# Patient Record
Sex: FEMALE | Race: WHITE | NOT HISPANIC OR LATINO | Employment: OTHER | URBAN - METROPOLITAN AREA
[De-identification: names, ages, dates, MRNs, and addresses within clinical notes are randomized per-mention and may not be internally consistent; named-entity substitution may affect disease eponyms.]

---

## 2021-02-13 DIAGNOSIS — Z23 ENCOUNTER FOR IMMUNIZATION: ICD-10-CM

## 2021-02-24 ENCOUNTER — IMMUNIZATIONS (OUTPATIENT)
Dept: FAMILY MEDICINE CLINIC | Facility: HOSPITAL | Age: 66
End: 2021-02-24

## 2021-02-24 DIAGNOSIS — Z23 ENCOUNTER FOR IMMUNIZATION: Primary | ICD-10-CM

## 2021-02-24 PROCEDURE — 91300 SARS-COV-2 / COVID-19 MRNA VACCINE (PFIZER-BIONTECH) 30 MCG: CPT

## 2021-02-24 PROCEDURE — 0001A SARS-COV-2 / COVID-19 MRNA VACCINE (PFIZER-BIONTECH) 30 MCG: CPT

## 2021-03-15 RX ORDER — IRBESARTAN 150 MG/1
150 TABLET ORAL DAILY
COMMUNITY
Start: 2020-09-11 | End: 2021-05-12 | Stop reason: SDUPTHER

## 2021-03-15 RX ORDER — AMLODIPINE BESYLATE 5 MG/1
5 TABLET ORAL DAILY
COMMUNITY
Start: 2020-09-11 | End: 2021-10-19 | Stop reason: SDUPTHER

## 2021-03-15 RX ORDER — BUSPIRONE HYDROCHLORIDE 5 MG/1
5 TABLET ORAL 3 TIMES DAILY
COMMUNITY
Start: 2020-09-11 | End: 2021-10-19 | Stop reason: SDUPTHER

## 2021-03-15 RX ORDER — PANTOPRAZOLE SODIUM 40 MG/1
40 TABLET, DELAYED RELEASE ORAL DAILY
COMMUNITY
Start: 2020-09-11 | End: 2021-10-19 | Stop reason: SDUPTHER

## 2021-03-16 ENCOUNTER — IMMUNIZATIONS (OUTPATIENT)
Dept: FAMILY MEDICINE CLINIC | Facility: HOSPITAL | Age: 66
End: 2021-03-16

## 2021-03-16 DIAGNOSIS — Z23 ENCOUNTER FOR IMMUNIZATION: Primary | ICD-10-CM

## 2021-03-16 PROCEDURE — 0002A SARS-COV-2 / COVID-19 MRNA VACCINE (PFIZER-BIONTECH) 30 MCG: CPT

## 2021-03-16 PROCEDURE — 91300 SARS-COV-2 / COVID-19 MRNA VACCINE (PFIZER-BIONTECH) 30 MCG: CPT

## 2021-04-23 ENCOUNTER — OFFICE VISIT (OUTPATIENT)
Dept: FAMILY MEDICINE CLINIC | Facility: CLINIC | Age: 66
End: 2021-04-23
Payer: MEDICARE

## 2021-04-23 VITALS
HEIGHT: 64 IN | OXYGEN SATURATION: 99 % | SYSTOLIC BLOOD PRESSURE: 160 MMHG | HEART RATE: 87 BPM | DIASTOLIC BLOOD PRESSURE: 100 MMHG | WEIGHT: 140 LBS | TEMPERATURE: 98.2 F | BODY MASS INDEX: 23.9 KG/M2

## 2021-04-23 DIAGNOSIS — Z12.11 SCREENING FOR COLORECTAL CANCER: ICD-10-CM

## 2021-04-23 DIAGNOSIS — I10 ESSENTIAL HYPERTENSION: ICD-10-CM

## 2021-04-23 DIAGNOSIS — R01.1 MURMUR: ICD-10-CM

## 2021-04-23 DIAGNOSIS — Z13.1 SCREENING FOR DIABETES MELLITUS: ICD-10-CM

## 2021-04-23 DIAGNOSIS — T78.40XA ALLERGY, INITIAL ENCOUNTER: ICD-10-CM

## 2021-04-23 DIAGNOSIS — Z12.12 SCREENING FOR COLORECTAL CANCER: ICD-10-CM

## 2021-04-23 DIAGNOSIS — R42 LIGHTHEADEDNESS: Primary | ICD-10-CM

## 2021-04-23 DIAGNOSIS — Z11.59 NEED FOR HEPATITIS C SCREENING TEST: ICD-10-CM

## 2021-04-23 DIAGNOSIS — K21.9 GASTROESOPHAGEAL REFLUX DISEASE WITHOUT ESOPHAGITIS: ICD-10-CM

## 2021-04-23 DIAGNOSIS — Z12.31 ENCOUNTER FOR SCREENING MAMMOGRAM FOR MALIGNANT NEOPLASM OF BREAST: ICD-10-CM

## 2021-04-23 DIAGNOSIS — R45.89 DEPRESSED MOOD: ICD-10-CM

## 2021-04-23 DIAGNOSIS — Z23 ENCOUNTER FOR IMMUNIZATION: ICD-10-CM

## 2021-04-23 DIAGNOSIS — F41.9 ANXIETY: ICD-10-CM

## 2021-04-23 PROCEDURE — 99203 OFFICE O/P NEW LOW 30 MIN: CPT | Performed by: FAMILY MEDICINE

## 2021-04-23 RX ORDER — FLUTICASONE PROPIONATE 50 MCG
2 SPRAY, SUSPENSION (ML) NASAL DAILY
Qty: 1 BOTTLE | Refills: 3 | Status: SHIPPED | OUTPATIENT
Start: 2021-04-23 | End: 2021-10-19 | Stop reason: SDUPTHER

## 2021-04-23 NOTE — ASSESSMENT & PLAN NOTE
Frequent lightheadedness with sinus pressure - likely due to sinus congestion from allergies  Also consider orthostasis vs dehydration   R/o cardiac issues contributing, as holosystolic murmur appreciated on exam  · Will start flonase  · Encourage PO intake  · Obtain ECHO

## 2021-04-23 NOTE — ASSESSMENT & PLAN NOTE
Poorly controlled; has concurrent depression but no SI/HI  Not taking anything at the moment   Has used buspar and prozac in the past  R/o other organic causes of anxiety/depressed mood  · Discuss at future visit   · Screen at next visit with PIPPA 7 and PHQ-9  · Would recommend starting with lexapro or zoloft as first line   · Labs: CBC, CMP, TSH

## 2021-04-23 NOTE — PATIENT INSTRUCTIONS
· Debrox for earwax removal  · Check your blood pressure anytime throughout the day  Vary the time each day  Write down your blood pressure and bring it in at our next visit  Bring your cuff into the office at the next visit as well  · Please make sure you get your labs first thing in the morning on an empty stomach   This helps test for diabetes and it is important that you are fasting for at least 8 hours for this test   · I have ordered the following labs and imaging for you: CBC, CMP, TSH, Hepatitis C, ECHO, mammogram  · I have also referred you to GI to get a colonoscopy and endoscopy

## 2021-04-23 NOTE — PROGRESS NOTES
Assessment & Plan       Anxiety  Poorly controlled; has concurrent depression but no SI/HI  Not taking anything at the moment  Has used buspar and prozac in the past  R/o other organic causes of anxiety/depressed mood  · Discuss at future visit   · Screen at next visit with PIPPA 7 and PHQ-9  · Would recommend starting with lexapro or zoloft as first line   · Labs: CBC, CMP, TSH    Essential hypertension  Elevated in the office today, appears to be controlled at home; no concerning symptoms  Taking amlodipine 6mg and irbesartan 150mg daily, compliant  · Recommend bringing recording of BP (random times, taken daily) and bring in BP cuff - discussed with patient  · If BP not well controlled- would increase amlodipine to 10mg daily    Gastroesophageal reflux disease without esophagitis  Poorly controlled; still having some GERD symptoms and nausea on protonix 40 mg daily  Likely because taking incorrectly and also eating foods that exacerbate symptoms - taking after drinking a cup of coffee in the morning  I'm concerned for upper GI bleed since she coughed up some bright red blood the other day; reassuring as only a small amount and no recurrences since    Most likely due to upper GI bleed, however consider upper vs lower respiratory bleed vs upper GI bleed (nelson's vs AVM vs esophageal cancer) she also has significant risk factors including years of uncontrolled GERD and tobacco use in the past   · Provided with GERD education, and recommended she take protonix 30 minutes before eating; biggest barrier is she forgets- recommend she place the medication either in the bathroom or by her bed so she can take it first thing when she wakes up  She will consider this  · Referral to GI - likely will need EGD and screening colonoscopy  · CBC to monitor for anemia in context of bleed    Lightheadedness  Frequent lightheadedness with sinus pressure - likely due to sinus congestion from allergies   Also consider orthostasis vs dehydration  R/o cardiac issues contributing, as holosystolic murmur appreciated on exam  · Will start flonase  · Encourage PO intake  · Obtain ECHO       Diagnoses and all orders for this visit:    Lightheadedness    Need for hepatitis C screening test  -     Hepatitis C Antibody ([facility]); Future    Encounter for immunization    Screening for colorectal cancer  -     Ambulatory referral to Gastroenterology; Future    Screening for diabetes mellitus  -     Comprehensive metabolic panel; Future    Anxiety    Depressed mood  -     CBC and Platelet; Future  -     Comprehensive metabolic panel; Future  -     TSH, 3rd generation with Free T4 reflex; Future    Allergy, initial encounter    Encounter for screening mammogram for malignant neoplasm of breast  -     fluticasone (FLONASE) 50 mcg/act nasal spray; 2 sprays into each nostril daily  -     Mammo screening bilateral w 3d & cad; Future    Murmur  -     Echo complete with contrast if indicated; Future    Essential hypertension    Gastroesophageal reflux disease without esophagitis    Other orders  -     Cancel: TDAP VACCINE GREATER THAN OR EQUAL TO 8YO IM          Will also order: mammogram, Hep C antibody  Discussed with patient  History of Present Illness     Chief Complaint   Patient presents with   [address] [place] [address] Street is a 78 y o  female who presents today to establish care and for dizziness  Was seeing Dr Cindi Genao, however this is much closer for her  She said she coughed up blood a few weeks ago, bright red  Just a few spots  Lightheadedness  Happens when she moves forward, backwards, side to side - she gets too lightheaded  Has a lot of sinus congestion - worse at night when she is laying flat  Feels a lot of pressure in her face  Was taking claritin D which was helping with her congestion  However felt anxious with the claritin D  She eats  Does not drink a lot of fluid     Denies vision changes, hearing changes, ear pain or congestion  She did stab her ear with a Qtip, which caused some pain and blood  No hearing changes  This happened 2-3 weeks ago  Does get headaches occasionally - thinks they are more sinus and allergy related  PMHx:  Diagnosis - when - medications  · HTN - takes irbesartan 150mg daily and amlodipine 5mg daily ; blood pressures are 120s-130s/70s-80s at home  Sometimes higher at home but usually only when she is anxious  Highest is 150s-160s/90s-100s  Good compliance, doesn't miss any doses  · Anxiety - was taking buspirone 5mg daily but hasn't been taking it because wasn't sure if it was contributing to dizziness  Says it's prescribed TID, but she doesn't take it like this because it makes her tired  Hasn't tried anythign else  · GERD - She has had this for many years - for the last 20 years  Takes protonix 40mg daily  She is taking this after first meal and even after coffee      Allergies:  Substance - reaction  · PCN - gets hives  · Azithromycin - gets hives  · Denies every having anaphylactic reaction    FHx:  Family member - alive/dead - medical condition  · Mother - dead - she had a stroke   · Father - dead - brain tumor  · 5 sisters - 4  - 2  from lung cancer (were smokers), one had a cerebral aneurysm, one  from a complication of SLE    SocHx:  Tobacco: former smoker - quit 20 years ago  Smoked 1ppd for 20 years   Alcohol: denies current drinking  Drank socially when she was younger  Substance use: Denies any illicit drug use  Home/support system: lives home alone; gets around with a walker  Pets: no pets  Job: retired     Review of Systems   Review of Systems   Constitutional: Negative for chills, fever and unexpected weight change  HENT: Negative for rhinorrhea and sore throat  Negative for ear pain or discharge  Positive for congestion and sinus pressure and congestion  Eyes: Negative for pain and visual disturbance  Respiratory: Negative for cough and shortness of breath  Coughed up blood a few weeks ago  Cardiovascular: Negative for chest pain and leg swelling  Gastrointestinal: Negative for abdominal pain, constipation, diarrhea, vomiting  Positive for nausea  Endocrine: Negative for polydipsia and polyuria  Genitourinary: Negative for dysuria and hematuria  Musculoskeletal: Negative for joint swelling  Positive for joint pain  Skin: Negative for rash and wound  Allergic/Immunologic: Negative for environmental allergies and food allergies  Neurological: Negative for headaches  Psychiatric/Behavioral: Positive for dysphoric mood, anxiety and nervousness  No SI/HI  The following portions of the patient's history were reviewed and updated as appropriate: allergies, current medications, past family history, past medical history, past social history, past surgical history and problem list       Objective     /100   Pulse 87   Temp 98 2 °F (36 8 °C)   Ht 5' 3 5" (1 613 m)   Wt 63 5 kg (140 lb)   SpO2 99%   BMI 24 41 kg/m²        Physical Exam  Vitals signs and nursing note reviewed  Constitutional:       General: She is not in acute distress  Appearance: Normal appearance  She is well-developed  She is not ill-appearing, toxic-appearing or diaphoretic  Comments: Using walker   HENT:      Head: Normocephalic and atraumatic  Right Ear: External ear normal  There is no impacted cerumen  Left Ear: External ear normal  There is no impacted cerumen  Nose: Nose normal  No congestion or rhinorrhea  Mouth/Throat:      Mouth: Mucous membranes are moist       Pharynx: Oropharynx is clear  No oropharyngeal exudate or posterior oropharyngeal erythema  Eyes:      General: No scleral icterus  Right eye: No discharge  Left eye: No discharge  Conjunctiva/sclera: Conjunctivae normal    Neck:      Musculoskeletal: Normal range of motion and neck supple  Trachea: No tracheal deviation     Cardiovascular:      Rate and Rhythm: Normal rate and regular rhythm  Heart sounds: Murmur (holosystolic; best appreciated in the R upper sternal border) present  No friction rub  No gallop  Pulmonary:      Effort: Pulmonary effort is normal  No respiratory distress  Breath sounds: Normal breath sounds  No stridor  No wheezing or rales  Abdominal:      General: Bowel sounds are normal  There is no distension  Palpations: Abdomen is soft  Tenderness: There is no abdominal tenderness  There is no guarding or rebound  Musculoskeletal:      Right lower leg: No edema  Left lower leg: No edema  Skin:     General: Skin is warm  Capillary Refill: Capillary refill takes less than 2 seconds  Findings: No rash  Neurological:      Mental Status: She is alert     Psychiatric:         Mood and Affect: Mood normal          Behavior: Behavior normal            Signature: Chad Judd DO, [address], PGY-2  04/23/21

## 2021-04-23 NOTE — ASSESSMENT & PLAN NOTE
Poorly controlled; still having some GERD symptoms and nausea on protonix 40 mg daily  Likely because taking incorrectly and also eating foods that exacerbate symptoms - taking after drinking a cup of coffee in the morning  I'm concerned for upper GI bleed since she coughed up some bright red blood the other day; reassuring as only a small amount and no recurrences since    Most likely due to upper GI bleed, however consider upper vs lower respiratory bleed vs upper GI bleed (nelson's vs AVM vs esophageal cancer) she also has significant risk factors including years of uncontrolled GERD and tobacco use in the past   · Provided with GERD education, and recommended she take protonix 30 minutes before eating; biggest barrier is she forgets- recommend she place the medication either in the bathroom or by her bed so she can take it first thing when she wakes up   She will consider this  · Referral to GI - likely will need EGD and screening colonoscopy  · CBC to monitor for anemia in context of bleed

## 2021-04-23 NOTE — ASSESSMENT & PLAN NOTE
Elevated in the office today, appears to be controlled at home; no concerning symptoms   Taking amlodipine 6mg and irbesartan 150mg daily, compliant  · Recommend bringing recording of BP (random times, taken daily) and bring in BP cuff - discussed with patient  · If BP not well controlled- would increase amlodipine to 10mg daily

## 2021-05-10 ENCOUNTER — TELEPHONE (OUTPATIENT)
Dept: FAMILY MEDICINE CLINIC | Facility: CLINIC | Age: 66
End: 2021-05-10

## 2021-05-12 DIAGNOSIS — I10 ESSENTIAL HYPERTENSION: Primary | ICD-10-CM

## 2021-05-12 RX ORDER — IRBESARTAN 150 MG/1
150 TABLET ORAL DAILY
Qty: 30 TABLET | Refills: 0 | Status: SHIPPED | OUTPATIENT
Start: 2021-05-12 | End: 2021-06-09 | Stop reason: SDUPTHER

## 2021-05-24 ENCOUNTER — TELEPHONE (OUTPATIENT)
Dept: FAMILY MEDICINE CLINIC | Facility: CLINIC | Age: 66
End: 2021-05-24

## 2021-05-26 NOTE — TELEPHONE ENCOUNTER
Attempted to call patient on mobile - no option to leave a voicemail  Tried calling patient's home phone - sent to voicemail  LMOM to callback our office  Would prefer to start with tylenol until evaluation in the office  Please remind patient to get labs before next appointment  Thank you

## 2021-06-01 ENCOUNTER — OFFICE VISIT (OUTPATIENT)
Dept: FAMILY MEDICINE CLINIC | Facility: CLINIC | Age: 66
End: 2021-06-01
Payer: COMMERCIAL

## 2021-06-01 VITALS
HEART RATE: 71 BPM | WEIGHT: 137 LBS | TEMPERATURE: 98 F | HEIGHT: 64 IN | BODY MASS INDEX: 23.39 KG/M2 | DIASTOLIC BLOOD PRESSURE: 88 MMHG | SYSTOLIC BLOOD PRESSURE: 132 MMHG | OXYGEN SATURATION: 100 %

## 2021-06-01 DIAGNOSIS — M54.50 ACUTE BILATERAL LOW BACK PAIN WITHOUT SCIATICA: Primary | ICD-10-CM

## 2021-06-01 DIAGNOSIS — G89.29 CHRONIC PAIN OF LEFT KNEE: ICD-10-CM

## 2021-06-01 DIAGNOSIS — M25.562 CHRONIC PAIN OF LEFT KNEE: ICD-10-CM

## 2021-06-01 PROCEDURE — 3008F BODY MASS INDEX DOCD: CPT | Performed by: FAMILY MEDICINE

## 2021-06-01 PROCEDURE — 1160F RVW MEDS BY RX/DR IN RCRD: CPT | Performed by: FAMILY MEDICINE

## 2021-06-01 PROCEDURE — 1170F FXNL STATUS ASSESSED: CPT | Performed by: FAMILY MEDICINE

## 2021-06-01 PROCEDURE — G0439 PPPS, SUBSEQ VISIT: HCPCS | Performed by: FAMILY MEDICINE

## 2021-06-01 PROCEDURE — 99213 OFFICE O/P EST LOW 20 MIN: CPT | Performed by: FAMILY MEDICINE

## 2021-06-01 PROCEDURE — 1125F AMNT PAIN NOTED PAIN PRSNT: CPT | Performed by: FAMILY MEDICINE

## 2021-06-01 PROCEDURE — 3725F SCREEN DEPRESSION PERFORMED: CPT | Performed by: FAMILY MEDICINE

## 2021-06-01 PROCEDURE — 3288F FALL RISK ASSESSMENT DOCD: CPT | Performed by: FAMILY MEDICINE

## 2021-06-01 PROCEDURE — 1036F TOBACCO NON-USER: CPT | Performed by: FAMILY MEDICINE

## 2021-06-01 RX ORDER — NAPROXEN 500 MG/1
500 TABLET ORAL 2 TIMES DAILY WITH MEALS
Qty: 28 TABLET | Refills: 0 | Status: SHIPPED | OUTPATIENT
Start: 2021-06-01 | End: 2022-05-09 | Stop reason: ALTCHOICE

## 2021-06-01 NOTE — PROGRESS NOTES
Assessment and Plan:     Problem List Items Addressed This Visit     None      Visit Diagnoses     Acute bilateral low back pain without sciatica    -  Primary    Relevant Medications    naproxen (NAPROSYN) 500 mg tablet    Chronic pain of left knee        Relevant Orders    XR knee 3 vw left non injury           Preventive health issues were discussed with patient, and age appropriate screening tests were ordered as noted in patient's After Visit Summary  Personalized health advice and appropriate referrals for health education or preventive services given if needed, as noted in patient's After Visit Summary  History of Present Illness:     Patient presents for Welcome to Medicare visit  Patient Care Team:  Kyra Worrell DO as PCP - General (Family Medicine)     Review of Systems:     Review of Systems   Constitutional: Negative for chills and fever  HENT: Negative for congestion and sore throat  Respiratory: Negative for cough and shortness of breath  Cardiovascular: Negative for chest pain and leg swelling  Gastrointestinal: Positive for constipation  Negative for abdominal pain, anal bleeding, blood in stool, diarrhea, nausea and vomiting  Endocrine: Negative for polydipsia and polyuria  Genitourinary: Negative for difficulty urinating and dysuria  Musculoskeletal: Positive for back pain and gait problem (difficulty walking due to knee pain)  Neurological: Negative for light-headedness and headaches  Psychiatric/Behavioral: Positive for sleep disturbance  Negative for suicidal ideas  The patient is nervous/anxious         Problem List:     Patient Active Problem List   Diagnosis    Essential hypertension    Anxiety    Gastroesophageal reflux disease without esophagitis    Lightheadedness      Past Medical and Surgical History:     Past Medical History:   Diagnosis Date    Anxiety     Arthritis     left knee    Depression     Hypertension     Migraines     Rhinitis     Rosacea      Past Surgical History:   Procedure Laterality Date    BREAST LUMPECTOMY Left     pathology benign    CYST REMOVAL  2019    Excision of left axillary sebaceous cyst 2cm      Family History:     Family History   Problem Relation Age of Onset    Stroke Mother     Thrombocytopenia Mother     Other Father         neoplasm of brain     Brain cancer Father     Hypertension Father     Lung cancer Sister     Lung cancer Sister     Lupus Sister     Cerebral aneurysm Sister       Social History:        Social History     Socioeconomic History    Marital status:      Spouse name: None    Number of children: None    Years of education: None    Highest education level: None   Occupational History    Occupation: retired   Social Needs    Financial resource strain: None    Food insecurity     Worry: None     Inability: None    Transportation needs     Medical: None     Non-medical: None   Tobacco Use    Smoking status: Former Smoker     Packs/day:  00     Years: 20 00     Pack years: 20      Types: Cigarettes     Quit date:      Years since quittin 4    Smokeless tobacco: Never Used   Substance and Sexual Activity    Alcohol use: Not Currently    Drug use: Never    Sexual activity: None   Lifestyle    Physical activity     Days per week: None     Minutes per session: None    Stress: None   Relationships    Social connections     Talks on phone: None     Gets together: None     Attends Faith service: None     Active member of club or organization: None     Attends meetings of clubs or organizations: None     Relationship status: None    Intimate partner violence     Fear of current or ex partner: None     Emotionally abused: None     Physically abused: None     Forced sexual activity: None   Other Topics Concern    None   Social History Narrative    declined for colonoscopy/FOBT    Most recent tobacco use screenin2019      Advance directive:   Yes, living will     As per An Distance       Medications and Allergies:     Current Outpatient Medications   Medication Sig Dispense Refill    amLODIPine (NORVASC) 5 mg tablet Take 5 mg by mouth daily      busPIRone (BUSPAR) 5 mg tablet Take 5 mg by mouth Three times a day      fluticasone (FLONASE) 50 mcg/act nasal spray 2 sprays into each nostril daily 1 Bottle 3    irbesartan (AVAPRO) 150 mg tablet Take 1 tablet (150 mg total) by mouth daily 30 tablet 0    pantoprazole (PROTONIX) 40 mg tablet Take 40 mg by mouth daily      naproxen (NAPROSYN) 500 mg tablet Take 1 tablet (500 mg total) by mouth 2 (two) times a day with meals for 14 days 28 tablet 0     No current facility-administered medications for this visit  Allergies   Allergen Reactions    Azithromycin Hives and Itching    Clindamycin Hives    Penicillins Hives and Itching      Immunizations:     Immunization History   Administered Date(s) Administered    Influenza Split High Dose Preservative Free IM 09/01/2020    Pneumococcal Conjugate 13-Valent 09/15/2020    SARS-CoV-2 / COVID-19 mRNA IM ([employer]) 02/24/2021, 03/16/2021      Health Maintenance:         Topic Date Due    Hepatitis C Screening  Never done    Colorectal Cancer Screening  Never done    MAMMOGRAM  10/25/2020         Topic Date Due    DTaP,Tdap,and Td Vaccines (1 - Tdap) Never done      Medicare Screening Tests and Risk Assessments:     Friday juanita is here for her Subsequent Wellness visit  Last Medicare Wellness visit information reviewed, patient interviewed and updates made to the record today  Health Risk Assessment:   Patient rates overall health as good  Patient feels that their physical health rating is same  Patient is satisfied with their life  Eyesight was rated as slightly worse  Hearing was rated as same  Patient feels that their emotional and mental health rating is same  Patients states they are sometimes angry   Patient states they are always unusually tired/fatigued  Pain experienced in the last 7 days has been a lot  Patient's pain rating has been 8/10  Patient states that she has experienced no weight loss or gain in last 6 months  Wakes up in the middle of the night twice to use the restroom  Has difficulty falling back asleep  Has some anxiety that prevents ability to fall back asleep as well  Just worries about friend  Depression Screening:   PHQ-2 Score: 1      Fall Risk Screening: In the past year, patient has experienced: no history of falling in past year      Urinary Incontinence Screening:   Patient has not leaked urine accidently in the last six months  Home Safety:  Patient has trouble with stairs inside or outside of their home  Patient has working smoke alarms and has no working carbon monoxide detector  Home safety hazards include: none  Issue with stairs is from knee pain - injured it 2 years ago  Nutrition:   Current diet is Regular  She does have a sweet tooth - loves chocolate    Medications:   Patient is currently taking over-the-counter supplements  OTC medications include: see medication list  Patient is able to manage medications  Activities of Daily Living (ADLs)/Instrumental Activities of Daily Living (IADLs):   Walk and transfer into and out of bed and chair?: Yes  Dress and groom yourself?: Yes    Bathe or shower yourself?: Yes    Feed yourself? Yes  Do your laundry/housekeeping?: Yes  Manage your money, pay your bills and track your expenses?: Yes  Make your own meals?: Yes    Do your own shopping?: Yes    Previous Hospitalizations:   Any hospitalizations or ED visits within the last 12 months?: No      Advance Care Planning:   Living will: Yes    Durable POA for healthcare: No    Advanced directive: Yes    Five wishes given: Yes      Comments: Discussed POA  Discussed that if she does not get one notorized, it will automatically go to next of kin       Cognitive Screening:   Provider or family/friend/caregiver concerned regarding cognition?: No    PREVENTIVE SCREENINGS      Cardiovascular Screening:    General: Screening Not Indicated      Diabetes Screening:     General: Screening Not Indicated      Colorectal Cancer Screening:       Due for: FOBT/FIT      Breast Cancer Screening:     General: Screening Current      Cervical Cancer Screening:    General: Screening Not Indicated      Lung Cancer Screening:     General: Screening Not Indicated      Hepatitis C Screening:      Hep C Screening Accepted: Yes        Preventive Screening Comments: Blood work to be completed shortly    Screening, Brief Intervention, and Referral to Treatment (SBIRT)    Screening  Typical number of drinks in a day: 0    Single Item Drug Screening:  How often have you used an illegal drug (including marijuana) or a prescription medication for non-medical reasons in the past year? never    Single Item Drug Screen Score: 0  Interpretation: Negative screen for possible drug use disorder    Other Counseling Topics:   Car/seat belt/driving safety, sunscreen and regular weightbearing exercise  No exam data present     Physical Exam:     /88 (BP Location: Right arm, Patient Position: Sitting, Cuff Size: Large)   Pulse 71   Temp 98 °F (36 7 °C)   Ht 5' 3 5" (1 613 m)   Wt 62 1 kg (137 lb)   SpO2 100%   BMI 23 89 kg/m²     Physical Exam  Constitutional:       General: She is not in acute distress  Appearance: Normal appearance  She is normal weight  She is not ill-appearing, toxic-appearing or diaphoretic  HENT:      Head: Normocephalic and atraumatic  Mouth/Throat:      Mouth: Mucous membranes are moist       Pharynx: Oropharynx is clear  No oropharyngeal exudate  Eyes:      General: No scleral icterus  Right eye: No discharge  Left eye: No discharge  Conjunctiva/sclera: Conjunctivae normal    Neck:      Musculoskeletal: Normal range of motion     Pulmonary:      Effort: Pulmonary effort is normal  No respiratory distress  Breath sounds: Normal breath sounds  No stridor  No wheezing  Musculoskeletal:      Right lower leg: No edema  Left lower leg: No edema  Comments: L knee tenderness to palpation on the patella  No crepitus appreciated with knee flexion or extension  Pain with flexion and extension  No joint effusions appreciated or joint swelling  Slight tenderness to palpation along medial and lateral joint lines    Lumbar back tenderness to palpation  Slight hypertonicity to paraspinal muscles  No midline tenderness when palpating spinous processes of vertebral bodies   Neurological:      Mental Status: She is alert and oriented to person, place, and time  Mental status is at baseline     Psychiatric:         Mood and Affect: Mood normal          Behavior: Behavior normal           Nadia East,

## 2021-06-01 NOTE — PATIENT INSTRUCTIONS
Medicare Preventive Visit Patient Instructions  Thank you for completing your Welcome to Medicare Visit or Medicare Annual Wellness Visit today  Your next wellness visit will be due in one year (6/2/2022)  The screening/preventive services that you may require over the next 5-10 years are detailed below  Some tests may not apply to you based off risk factors and/or age  Screening tests ordered at today's visit but not completed yet may show as past due  Also, please note that scanned in results may not display below  Preventive Screenings:  Service Recommendations Previous Testing/Comments   Colorectal Cancer Screening  * Colonoscopy    * Fecal Occult Blood Test (FOBT)/Fecal Immunochemical Test (FIT)  * Fecal DNA/Cologuard Test  * Flexible Sigmoidoscopy Age: 54-65 years old   Colonoscopy: every 10 years (may be performed more frequently if at higher risk)  OR  FOBT/FIT: every 1 year  OR  Cologuard: every 3 years  OR  Sigmoidoscopy: every 5 years  Screening may be recommended earlier than age 48 if at higher risk for colorectal cancer  Also, an individualized decision between you and your healthcare provider will decide whether screening between the ages of 74-80 would be appropriate  Colonoscopy: Not on file  FOBT/FIT: Not on file  Cologuard: Not on file  Sigmoidoscopy: Not on file          Breast Cancer Screening Age: 36 years old  Frequency: every 1-2 years  Not required if history of left and right mastectomy Mammogram: 10/25/2019        Cervical Cancer Screening Between the ages of 21-29, pap smear recommended once every 3 years  Between the ages of 33-67, can perform pap smear with HPV co-testing every 5 years     Recommendations may differ for women with a history of total hysterectomy, cervical cancer, or abnormal pap smears in past  Pap Smear: Not on file        Hepatitis C Screening Once for adults born between [facility]  More frequently in patients at high risk for Hepatitis C Hep C Antibody: Not on file        Diabetes Screening 1-2 times per year if you're at risk for diabetes or have pre-diabetes Fasting glucose: No results in last 5 years   A1C: No results in last 5 years        Cholesterol Screening Once every 5 years if you don't have a lipid disorder  May order more often based on risk factors  Lipid panel: Not on file          Other Preventive Screenings Covered by Medicare:  1  Abdominal Aortic Aneurysm (AAA) Screening: covered once if your at risk  You're considered to be at risk if you have a family history of AAA  2  Lung Cancer Screening: covers low dose CT scan once per year if you meet all of the following conditions: (1) Age 50-69; (2) No signs or symptoms of lung cancer; (3) Current smoker or have quit smoking within the last 15 years; (4) You have a tobacco smoking history of at least 30 pack years (packs per day multiplied by number of years you smoked); (5) You get a written order from a healthcare provider  3  Glaucoma Screening: covered annually if you're considered high risk: (1) You have diabetes OR (2) Family history of glaucoma OR (3)  aged 48 and older OR (3)  American aged 72 and older  3  Osteoporosis Screening: covered every 2 years if you meet one of the following conditions: (1) You're estrogen deficient and at risk for osteoporosis based off medical history and other findings; (2) Have a vertebral abnormality; (3) On glucocorticoid therapy for more than 3 months; (4) Have primary hyperparathyroidism; (5) On osteoporosis medications and need to assess response to drug therapy  · Last bone density test (DXA Scan): 10/25/2019   5  HIV Screening: covered annually if you're between the age of 15-65  Also covered annually if you are younger than 13 and older than 72 with risk factors for HIV infection  For pregnant patients, it is covered up to 3 times per pregnancy      Immunizations:  Immunization Recommendations   Influenza Vaccine Annual influenza vaccination during flu season is recommended for all persons aged >= 6 months who do not have contraindications   Pneumococcal Vaccine (Prevnar and Pneumovax)  * Prevnar = PCV13  * Pneumovax = PPSV23   Adults 25-60 years old: 1-3 doses may be recommended based on certain risk factors  Adults 72 years old: Prevnar (PCV13) vaccine recommended followed by Pneumovax (PPSV23) vaccine  If already received PPSV23 since turning 65, then PCV13 recommended at least one year after PPSV23 dose  Hepatitis B Vaccine 3 dose series if at intermediate or high risk (ex: diabetes, end stage renal disease, liver disease)   Tetanus (Td) Vaccine - COST NOT COVERED BY MEDICARE PART B Following completion of primary series, a booster dose should be given every 10 years to maintain immunity against tetanus  Td may also be given as tetanus wound prophylaxis  Tdap Vaccine - COST NOT COVERED BY MEDICARE PART B Recommended at least once for all adults  For pregnant patients, recommended with each pregnancy  Shingles Vaccine (Shingrix) - COST NOT COVERED BY MEDICARE PART B  2 shot series recommended in those aged 48 and above     Health Maintenance Due:      Topic Date Due    Hepatitis C Screening  Never done    Colorectal Cancer Screening  Never done    MAMMOGRAM  10/25/2020     Immunizations Due:      Topic Date Due    DTaP,Tdap,and Td Vaccines (1 - Tdap) Never done     Advance Directives   What are advance directives? Advance directives are legal documents that state your wishes and plans for medical care  These plans are made ahead of time in case you lose your ability to make decisions for yourself  Advance directives can apply to any medical decision, such as the treatments you want, and if you want to donate organs  What are the types of advance directives? There are many types of advance directives, and each state has rules about how to use them  You may choose a combination of any of the following:  · Living will:   This is a written record of the treatment you want  You can also choose which treatments you do not want, which to limit, and which to stop at a certain time  This includes surgery, medicine, IV fluid, and tube feedings  · Durable power of  for healthcare [place] [facility]): This is a written record that states who you want to make healthcare choices for you when you are unable to make them for yourself  This person, called a proxy, is usually a family member or a friend  You may choose more than 1 proxy  · Do not resuscitate (DNR) order:  A DNR order is used in case your heart stops beating or you stop breathing  It is a request not to have certain forms of treatment, such as CPR  A DNR order may be included in other types of advance directives  · Medical directive: This covers the care that you want if you are in a coma, near death, or unable to make decisions for yourself  You can list the treatments you want for each condition  Treatment may include pain medicine, surgery, blood transfusions, dialysis, IV or tube feedings, and a ventilator (breathing machine)  · Values history: This document has questions about your views, beliefs, and how you feel and think about life  This information can help others choose the care that you would choose  Why are advance directives important? An advance directive helps you control your care  Although spoken wishes may be used, it is better to have your wishes written down  Spoken wishes can be misunderstood, or not followed  Treatments may be given even if you do not want them  An advance directive may make it easier for your family to make difficult choices about your care  © Copyright [employer] 2018 Information is for End User's use only and may not be sold, redistributed or otherwise used for commercial purposes   All illustrations and images included in CareNotes® are the copyrighted property of [employer]  or [facility] Preventive Visit Patient Instructions  Thank you for completing your Welcome to Medicare Visit or Medicare Annual Wellness Visit today  Your next wellness visit will be due in one year (6/2/2022)  The screening/preventive services that you may require over the next 5-10 years are detailed below  Some tests may not apply to you based off risk factors and/or age  Screening tests ordered at today's visit but not completed yet may show as past due  Also, please note that scanned in results may not display below  Preventive Screenings:  Service Recommendations Previous Testing/Comments   Colorectal Cancer Screening  * Colonoscopy    * Fecal Occult Blood Test (FOBT)/Fecal Immunochemical Test (FIT)  * Fecal DNA/Cologuard Test  * Flexible Sigmoidoscopy Age: 54-65 years old   Colonoscopy: every 10 years (may be performed more frequently if at higher risk)  OR  FOBT/FIT: every 1 year  OR  Cologuard: every 3 years  OR  Sigmoidoscopy: every 5 years  Screening may be recommended earlier than age 48 if at higher risk for colorectal cancer  Also, an individualized decision between you and your healthcare provider will decide whether screening between the ages of 74-80 would be appropriate  Colonoscopy: Not on file  FOBT/FIT: Not on file  Cologuard: Not on file  Sigmoidoscopy: Not on file          Breast Cancer Screening Age: 36 years old  Frequency: every 1-2 years  Not required if history of left and right mastectomy Mammogram: 10/25/2019    Screening Current   Cervical Cancer Screening Between the ages of 21-29, pap smear recommended once every 3 years  Between the ages of 33-67, can perform pap smear with HPV co-testing every 5 years     Recommendations may differ for women with a history of total hysterectomy, cervical cancer, or abnormal pap smears in past  Pap Smear: Not on file    Screening Not Indicated   Hepatitis C Screening Once for adults born between 1945 and 1965  More frequently in patients at high risk for Hepatitis C Hep C Antibody: Not on file        Diabetes Screening 1-2 times per year if you're at risk for diabetes or have pre-diabetes Fasting glucose: No results in last 5 years   A1C: No results in last 5 years        Cholesterol Screening Once every 5 years if you don't have a lipid disorder  May order more often based on risk factors  Lipid panel: Not on file          Other Preventive Screenings Covered by Medicare:  6  Abdominal Aortic Aneurysm (AAA) Screening: covered once if your at risk  You're considered to be at risk if you have a family history of AAA  7  Lung Cancer Screening: covers low dose CT scan once per year if you meet all of the following conditions: (1) Age 50-69; (2) No signs or symptoms of lung cancer; (3) Current smoker or have quit smoking within the last 15 years; (4) You have a tobacco smoking history of at least 30 pack years (packs per day multiplied by number of years you smoked); (5) You get a written order from a healthcare provider  8  Glaucoma Screening: covered annually if you're considered high risk: (1) You have diabetes OR (2) Family history of glaucoma OR (3)  aged 48 and older OR (3)  American aged 72 and older  5  Osteoporosis Screening: covered every 2 years if you meet one of the following conditions: (1) You're estrogen deficient and at risk for osteoporosis based off medical history and other findings; (2) Have a vertebral abnormality; (3) On glucocorticoid therapy for more than 3 months; (4) Have primary hyperparathyroidism; (5) On osteoporosis medications and need to assess response to drug therapy  · Last bone density test (DXA Scan): 10/25/2019   10  HIV Screening: covered annually if you're between the age of 15-65  Also covered annually if you are younger than 13 and older than 72 with risk factors for HIV infection  For pregnant patients, it is covered up to 3 times per pregnancy      Immunizations:  Immunization Recommendations   Influenza Vaccine Annual influenza vaccination during flu season is recommended for all persons aged >= 6 months who do not have contraindications   Pneumococcal Vaccine (Prevnar and Pneumovax)  * Prevnar = PCV13  * Pneumovax = PPSV23   Adults 25-60 years old: 1-3 doses may be recommended based on certain risk factors  Adults 72 years old: Prevnar (PCV13) vaccine recommended followed by Pneumovax (PPSV23) vaccine  If already received PPSV23 since turning 65, then PCV13 recommended at least one year after PPSV23 dose  Hepatitis B Vaccine 3 dose series if at intermediate or high risk (ex: diabetes, end stage renal disease, liver disease)   Tetanus (Td) Vaccine - COST NOT COVERED BY MEDICARE PART B Following completion of primary series, a booster dose should be given every 10 years to maintain immunity against tetanus  Td may also be given as tetanus wound prophylaxis  Tdap Vaccine - COST NOT COVERED BY MEDICARE PART B Recommended at least once for all adults  For pregnant patients, recommended with each pregnancy  Shingles Vaccine (Shingrix) - COST NOT COVERED BY MEDICARE PART B  2 shot series recommended in those aged 48 and above     Health Maintenance Due:      Topic Date Due    Hepatitis C Screening  Never done    Colorectal Cancer Screening  Never done    MAMMOGRAM  10/25/2020     Immunizations Due:      Topic Date Due    DTaP,Tdap,and Td Vaccines (1 - Tdap) Never done     Advance Directives   What are advance directives? Advance directives are legal documents that state your wishes and plans for medical care  These plans are made ahead of time in case you lose your ability to make decisions for yourself  Advance directives can apply to any medical decision, such as the treatments you want, and if you want to donate organs  What are the types of advance directives? There are many types of advance directives, and each state has rules about how to use them   You may choose a combination of any of the following:  · Living will: This is a written record of the treatment you want  You can also choose which treatments you do not want, which to limit, and which to stop at a certain time  This includes surgery, medicine, IV fluid, and tube feedings  · Durable power of  for healthcare [place] [facility]): This is a written record that states who you want to make healthcare choices for you when you are unable to make them for yourself  This person, called a proxy, is usually a family member or a friend  You may choose more than 1 proxy  · Do not resuscitate (DNR) order:  A DNR order is used in case your heart stops beating or you stop breathing  It is a request not to have certain forms of treatment, such as CPR  A DNR order may be included in other types of advance directives  · Medical directive: This covers the care that you want if you are in a coma, near death, or unable to make decisions for yourself  You can list the treatments you want for each condition  Treatment may include pain medicine, surgery, blood transfusions, dialysis, IV or tube feedings, and a ventilator (breathing machine)  · Values history: This document has questions about your views, beliefs, and how you feel and think about life  This information can help others choose the care that you would choose  Why are advance directives important? An advance directive helps you control your care  Although spoken wishes may be used, it is better to have your wishes written down  Spoken wishes can be misunderstood, or not followed  Treatments may be given even if you do not want them  An advance directive may make it easier for your family to make difficult choices about your care  © Copyright [employer] 2018 Information is for End User's use only and may not be sold, redistributed or otherwise used for commercial purposes   All illustrations and images included in CareNotes® are the copyrighted property of A D A M , Inc  or [employer]

## 2021-06-01 NOTE — PROGRESS NOTES
Assessment & Plan        Diagnoses and all orders for this visit:    Acute bilateral low back pain without sciatica  -     naproxen (NAPROSYN) 500 mg tablet; Take 1 tablet (500 mg total) by mouth 2 (two) times a day with meals for 14 days    Chronic pain of left knee  -     XR knee 3 vw left non injury; Future           Chronic Knee pain - likely due to initial injury 2 years ago  May have developed OA  May benefit from steroid injection  Will obtain XR to monitor for any osseous abnormality  Recommended PT since I think she may be altering gait to avoid knee pain, which may be leading to back pain  Not interested at this time  She says, "I am stubborn "    Acute low back pain - consider muscle strain/ hypertonicity in paraspinals or quadratus lumborum or illeopsoas  Would benefit from PT and anti-inflammatories  Will prescribe 2 week course of naproxen 500mg BID and encouraged compliance to naproxen and protonix, and discussed importance of taking with food  Follow up recommended in 6 weeks  Patient reports she likely won't come over the summer  History of Present Illness     Chief Complaint   Patient presents with    Medicare Wellness Visit       Binta Harrison is a 78 y o  female who presents today for back pain  Been going in for 1 week  Started in lower back  Radiates in the low back, but not down the back of the legs  Constantly there even when she is sitting  Is not disrupting sleep  Denies any trigger  Denies any any loss of sensation or weakness in the lower extremities  Has been taking aleve and tylenol with minimal relief of back pain  She says she doesn't do well with patches  Does not like the patches  Also has some L knee pain  She fell on it 2 years ago  Knee pain worsens with activity    She is having someone come on June 8 to get labs drawn  Review of Systems     Review of Systems   Constitutional: Negative for chills and fever     Gastrointestinal: Positive for constipation  Negative for abdominal pain, anal bleeding, blood in stool, diarrhea, nausea and vomiting  Musculoskeletal: Positive for arthralgias (just L knee), back pain and myalgias  Negative for joint swelling and neck pain  Skin: Negative for rash and wound  Neurological: Negative for weakness and numbness  Psychiatric/Behavioral: Positive for sleep disturbance  The patient is nervous/anxious  The following portions of the patient's history were reviewed and updated as appropriate: allergies, current medications, past family history, past medical history, past social history, past surgical history and problem list     Objective     /88 (BP Location: Right arm, Patient Position: Sitting, Cuff Size: Large)   Pulse 71   Temp 98 °F (36 7 °C)   Ht 5' 3 5" (1 613 m)   Wt 62 1 kg (137 lb)   SpO2 100%   BMI 23 89 kg/m²          Physical Exam  Constitutional:       General: She is not in acute distress  Appearance: Normal appearance  She is not ill-appearing, toxic-appearing or diaphoretic  HENT:      Head: Normocephalic and atraumatic  Nose: Nose normal    Eyes:      General: No scleral icterus  Right eye: No discharge  Left eye: No discharge  Conjunctiva/sclera: Conjunctivae normal    Pulmonary:      Effort: Pulmonary effort is normal  No respiratory distress  Breath sounds: No stridor  No wheezing  Musculoskeletal:      Right lower leg: No edema  Left lower leg: No edema  Comments: L knee tenderness to palpation on the patella  No crepitus appreciated with knee flexion or extension  Pain with flexion and extension  No joint effusions appreciated or joint swelling  Slight tenderness to palpation along medial and lateral joint lines    Lumbar back tenderness to palpation  Slight hypertonicity to paraspinal muscles   No midline tenderness when palpating spinous processes of vertebral bodies   Skin:     Capillary Refill: Capillary refill takes less than 2 seconds  Neurological:      Mental Status: She is alert and oriented to person, place, and time     Psychiatric:         Mood and Affect: Mood normal          Behavior: Behavior normal            Signature: Hitesh Caballero DO, [address], PGY-2  06/01/21

## 2021-06-08 ENCOUNTER — APPOINTMENT (OUTPATIENT)
Dept: LAB | Facility: HOSPITAL | Age: 66
End: 2021-06-08
Payer: COMMERCIAL

## 2021-06-08 DIAGNOSIS — Z11.59 NEED FOR HEPATITIS C SCREENING TEST: ICD-10-CM

## 2021-06-08 DIAGNOSIS — Z13.1 SCREENING FOR DIABETES MELLITUS: ICD-10-CM

## 2021-06-08 DIAGNOSIS — R45.89 DEPRESSED MOOD: ICD-10-CM

## 2021-06-08 LAB
ALBUMIN SERPL BCP-MCNC: 4.3 G/DL (ref 3.5–5)
ALP SERPL-CCNC: 72 U/L (ref 46–116)
ALT SERPL W P-5'-P-CCNC: 23 U/L (ref 12–78)
ANION GAP SERPL CALCULATED.3IONS-SCNC: 9 MMOL/L (ref 4–13)
AST SERPL W P-5'-P-CCNC: 19 U/L (ref 5–45)
BILIRUB SERPL-MCNC: 0.45 MG/DL (ref 0.2–1)
BUN SERPL-MCNC: 11 MG/DL (ref 5–25)
CALCIUM SERPL-MCNC: 9.9 MG/DL (ref 8.3–10.1)
CHLORIDE SERPL-SCNC: 99 MMOL/L (ref 100–108)
CO2 SERPL-SCNC: 25 MMOL/L (ref 21–32)
CREAT SERPL-MCNC: 0.53 MG/DL (ref 0.6–1.3)
ERYTHROCYTE [DISTWIDTH] IN BLOOD BY AUTOMATED COUNT: 13.5 % (ref 11.6–15.1)
GFR SERPL CREATININE-BSD FRML MDRD: 99 ML/MIN/1.73SQ M
GLUCOSE SERPL-MCNC: 98 MG/DL (ref 65–140)
HCT VFR BLD AUTO: 40.1 % (ref 34.8–46.1)
HCV AB SER QL: NORMAL
HGB BLD-MCNC: 13.1 G/DL (ref 11.5–15.4)
MCH RBC QN AUTO: 31.3 PG (ref 26.8–34.3)
MCHC RBC AUTO-ENTMCNC: 32.7 G/DL (ref 31.4–37.4)
MCV RBC AUTO: 96 FL (ref 82–98)
PLATELET # BLD AUTO: 339 THOUSANDS/UL (ref 149–390)
PMV BLD AUTO: 9.6 FL (ref 8.9–12.7)
POTASSIUM SERPL-SCNC: 3.5 MMOL/L (ref 3.5–5.3)
PROT SERPL-MCNC: 8.1 G/DL (ref 6.4–8.2)
RBC # BLD AUTO: 4.18 MILLION/UL (ref 3.81–5.12)
SODIUM SERPL-SCNC: 133 MMOL/L (ref 136–145)
TSH SERPL DL<=0.05 MIU/L-ACNC: 1.94 UIU/ML (ref 0.36–3.74)
WBC # BLD AUTO: 7.29 THOUSAND/UL (ref 4.31–10.16)

## 2021-06-08 PROCEDURE — 86803 HEPATITIS C AB TEST: CPT

## 2021-06-08 PROCEDURE — 84443 ASSAY THYROID STIM HORMONE: CPT

## 2021-06-08 PROCEDURE — 85027 COMPLETE CBC AUTOMATED: CPT

## 2021-06-08 PROCEDURE — 80053 COMPREHEN METABOLIC PANEL: CPT

## 2021-06-08 PROCEDURE — 36415 COLL VENOUS BLD VENIPUNCTURE: CPT

## 2021-06-09 DIAGNOSIS — I10 ESSENTIAL HYPERTENSION: ICD-10-CM

## 2021-06-11 RX ORDER — IRBESARTAN 150 MG/1
150 TABLET ORAL DAILY
Qty: 30 TABLET | Refills: 0 | Status: SHIPPED | OUTPATIENT
Start: 2021-06-11 | End: 2021-10-19 | Stop reason: SDUPTHER

## 2021-09-24 ENCOUNTER — TELEPHONE (OUTPATIENT)
Dept: FAMILY MEDICINE CLINIC | Facility: CLINIC | Age: 66
End: 2021-09-24

## 2021-10-19 ENCOUNTER — OFFICE VISIT (OUTPATIENT)
Dept: FAMILY MEDICINE CLINIC | Facility: CLINIC | Age: 66
End: 2021-10-19
Payer: COMMERCIAL

## 2021-10-19 VITALS
WEIGHT: 138.38 LBS | BODY MASS INDEX: 24.13 KG/M2 | HEART RATE: 87 BPM | SYSTOLIC BLOOD PRESSURE: 142 MMHG | TEMPERATURE: 98.5 F | DIASTOLIC BLOOD PRESSURE: 90 MMHG | OXYGEN SATURATION: 99 %

## 2021-10-19 DIAGNOSIS — Z23 ENCOUNTER FOR IMMUNIZATION: ICD-10-CM

## 2021-10-19 DIAGNOSIS — Z12.31 ENCOUNTER FOR SCREENING MAMMOGRAM FOR MALIGNANT NEOPLASM OF BREAST: ICD-10-CM

## 2021-10-19 DIAGNOSIS — F41.9 ANXIETY: ICD-10-CM

## 2021-10-19 DIAGNOSIS — K21.9 GASTROESOPHAGEAL REFLUX DISEASE WITHOUT ESOPHAGITIS: ICD-10-CM

## 2021-10-19 DIAGNOSIS — I10 ESSENTIAL HYPERTENSION: ICD-10-CM

## 2021-10-19 DIAGNOSIS — M25.562 LEFT KNEE PAIN, UNSPECIFIED CHRONICITY: Primary | ICD-10-CM

## 2021-10-19 PROBLEM — M25.561 RIGHT KNEE PAIN: Status: ACTIVE | Noted: 2021-10-19

## 2021-10-19 PROCEDURE — 90662 IIV NO PRSV INCREASED AG IM: CPT | Performed by: FAMILY MEDICINE

## 2021-10-19 PROCEDURE — 99213 OFFICE O/P EST LOW 20 MIN: CPT | Performed by: FAMILY MEDICINE

## 2021-10-19 PROCEDURE — G0008 ADMIN INFLUENZA VIRUS VAC: HCPCS | Performed by: FAMILY MEDICINE

## 2021-10-19 RX ORDER — AMLODIPINE BESYLATE 5 MG/1
5 TABLET ORAL DAILY
Qty: 30 TABLET | Refills: 1 | Status: SHIPPED | OUTPATIENT
Start: 2021-10-19 | End: 2021-12-16

## 2021-10-19 RX ORDER — FLUTICASONE PROPIONATE 50 MCG
2 SPRAY, SUSPENSION (ML) NASAL DAILY
Qty: 20 ML | Refills: 1 | Status: SHIPPED | OUTPATIENT
Start: 2021-10-19 | End: 2021-12-16

## 2021-10-19 RX ORDER — IRBESARTAN 150 MG/1
150 TABLET ORAL DAILY
Qty: 30 TABLET | Refills: 0 | Status: SHIPPED | OUTPATIENT
Start: 2021-10-19 | End: 2021-11-16

## 2021-10-19 RX ORDER — PANTOPRAZOLE SODIUM 40 MG/1
40 TABLET, DELAYED RELEASE ORAL DAILY
Qty: 30 TABLET | Refills: 1 | Status: SHIPPED | OUTPATIENT
Start: 2021-10-19 | End: 2021-12-16

## 2021-10-19 RX ORDER — BUSPIRONE HYDROCHLORIDE 5 MG/1
5 TABLET ORAL
Qty: 30 TABLET | Refills: 1 | Status: SHIPPED | OUTPATIENT
Start: 2021-10-19 | End: 2021-12-16

## 2021-10-21 ENCOUNTER — HOSPITAL ENCOUNTER (OUTPATIENT)
Dept: RADIOLOGY | Facility: HOSPITAL | Age: 66
Discharge: HOME | End: 2021-10-21
Payer: COMMERCIAL

## 2021-10-21 ENCOUNTER — TELEPHONE (OUTPATIENT)
Dept: FAMILY MEDICINE CLINIC | Facility: CLINIC | Age: 66
End: 2021-10-21

## 2021-10-21 DIAGNOSIS — M25.562 CHRONIC PAIN OF LEFT KNEE: ICD-10-CM

## 2021-10-21 DIAGNOSIS — G89.29 CHRONIC PAIN OF LEFT KNEE: ICD-10-CM

## 2021-10-21 PROCEDURE — 73562 X-RAY EXAM OF KNEE 3: CPT

## 2021-10-25 ENCOUNTER — TELEPHONE (OUTPATIENT)
Dept: FAMILY MEDICINE CLINIC | Facility: CLINIC | Age: 66
End: 2021-10-25

## 2021-11-09 ENCOUNTER — VBI (OUTPATIENT)
Dept: ADMINISTRATIVE | Facility: OTHER | Age: 66
End: 2021-11-09

## 2021-11-15 DIAGNOSIS — I10 ESSENTIAL HYPERTENSION: ICD-10-CM

## 2021-11-16 RX ORDER — IRBESARTAN 150 MG/1
TABLET ORAL
Qty: 30 TABLET | Refills: 0 | Status: SHIPPED | OUTPATIENT
Start: 2021-11-16 | End: 2021-12-16

## 2021-12-13 DIAGNOSIS — F41.9 ANXIETY: ICD-10-CM

## 2021-12-13 DIAGNOSIS — Z12.31 ENCOUNTER FOR SCREENING MAMMOGRAM FOR MALIGNANT NEOPLASM OF BREAST: ICD-10-CM

## 2021-12-13 DIAGNOSIS — K21.9 GASTROESOPHAGEAL REFLUX DISEASE WITHOUT ESOPHAGITIS: ICD-10-CM

## 2021-12-13 DIAGNOSIS — I10 ESSENTIAL HYPERTENSION: ICD-10-CM

## 2021-12-16 DIAGNOSIS — F41.9 ANXIETY: ICD-10-CM

## 2021-12-16 DIAGNOSIS — I10 ESSENTIAL HYPERTENSION: ICD-10-CM

## 2021-12-16 DIAGNOSIS — K21.9 GASTROESOPHAGEAL REFLUX DISEASE WITHOUT ESOPHAGITIS: ICD-10-CM

## 2021-12-16 DIAGNOSIS — Z12.31 ENCOUNTER FOR SCREENING MAMMOGRAM FOR MALIGNANT NEOPLASM OF BREAST: ICD-10-CM

## 2021-12-16 RX ORDER — FLUTICASONE PROPIONATE 50 MCG
SPRAY, SUSPENSION (ML) NASAL
Qty: 16 G | Refills: 0 | Status: SHIPPED | OUTPATIENT
Start: 2021-12-16 | End: 2022-01-17

## 2021-12-16 RX ORDER — IRBESARTAN 150 MG/1
TABLET ORAL
Qty: 30 TABLET | Refills: 0 | Status: SHIPPED | OUTPATIENT
Start: 2021-12-16 | End: 2022-01-17

## 2021-12-16 RX ORDER — AMLODIPINE BESYLATE 5 MG/1
5 TABLET ORAL DAILY
Qty: 30 TABLET | Refills: 0 | Status: SHIPPED | OUTPATIENT
Start: 2021-12-16 | End: 2022-01-17

## 2021-12-16 RX ORDER — AMLODIPINE BESYLATE 5 MG/1
TABLET ORAL
Qty: 30 TABLET | Refills: 0 | Status: SHIPPED | OUTPATIENT
Start: 2021-12-16 | End: 2021-12-16 | Stop reason: SDUPTHER

## 2021-12-16 RX ORDER — PANTOPRAZOLE SODIUM 40 MG/1
TABLET, DELAYED RELEASE ORAL
Qty: 30 TABLET | Refills: 0 | Status: SHIPPED | OUTPATIENT
Start: 2021-12-16 | End: 2022-01-17

## 2021-12-16 RX ORDER — BUSPIRONE HYDROCHLORIDE 5 MG/1
TABLET ORAL
Qty: 30 TABLET | Refills: 0 | Status: SHIPPED | OUTPATIENT
Start: 2021-12-16 | End: 2022-01-17

## 2022-01-17 DIAGNOSIS — I10 ESSENTIAL HYPERTENSION: ICD-10-CM

## 2022-01-17 DIAGNOSIS — Z12.31 ENCOUNTER FOR SCREENING MAMMOGRAM FOR MALIGNANT NEOPLASM OF BREAST: ICD-10-CM

## 2022-01-17 DIAGNOSIS — K21.9 GASTROESOPHAGEAL REFLUX DISEASE WITHOUT ESOPHAGITIS: ICD-10-CM

## 2022-01-17 DIAGNOSIS — F41.9 ANXIETY: ICD-10-CM

## 2022-01-17 RX ORDER — BUSPIRONE HYDROCHLORIDE 5 MG/1
TABLET ORAL
Qty: 30 TABLET | Refills: 0 | Status: SHIPPED | OUTPATIENT
Start: 2022-01-17 | End: 2022-05-09 | Stop reason: ALTCHOICE

## 2022-01-17 RX ORDER — IRBESARTAN 150 MG/1
TABLET ORAL
Qty: 30 TABLET | Refills: 0 | Status: SHIPPED | OUTPATIENT
Start: 2022-01-17 | End: 2022-01-26 | Stop reason: SDUPTHER

## 2022-01-17 RX ORDER — PANTOPRAZOLE SODIUM 40 MG/1
TABLET, DELAYED RELEASE ORAL
Qty: 30 TABLET | Refills: 0 | Status: SHIPPED | OUTPATIENT
Start: 2022-01-17 | End: 2022-02-23

## 2022-01-17 RX ORDER — AMLODIPINE BESYLATE 5 MG/1
TABLET ORAL
Qty: 30 TABLET | Refills: 0 | Status: SHIPPED | OUTPATIENT
Start: 2022-01-17 | End: 2022-02-23

## 2022-01-17 RX ORDER — FLUTICASONE PROPIONATE 50 MCG
SPRAY, SUSPENSION (ML) NASAL
Qty: 16 G | Refills: 0 | Status: SHIPPED | OUTPATIENT
Start: 2022-01-17 | End: 2022-03-22

## 2022-01-26 DIAGNOSIS — I10 ESSENTIAL HYPERTENSION: ICD-10-CM

## 2022-01-26 RX ORDER — IRBESARTAN 150 MG/1
150 TABLET ORAL DAILY
Qty: 90 TABLET | Refills: 0 | Status: SHIPPED | OUTPATIENT
Start: 2022-01-26 | End: 2022-05-09 | Stop reason: SDUPTHER

## 2022-02-23 DIAGNOSIS — I10 ESSENTIAL HYPERTENSION: ICD-10-CM

## 2022-02-23 DIAGNOSIS — K21.9 GASTROESOPHAGEAL REFLUX DISEASE WITHOUT ESOPHAGITIS: ICD-10-CM

## 2022-02-23 RX ORDER — PANTOPRAZOLE SODIUM 40 MG/1
TABLET, DELAYED RELEASE ORAL
Qty: 30 TABLET | Refills: 0 | Status: SHIPPED | OUTPATIENT
Start: 2022-02-23 | End: 2022-03-22

## 2022-02-23 RX ORDER — AMLODIPINE BESYLATE 5 MG/1
TABLET ORAL
Qty: 30 TABLET | Refills: 0 | Status: SHIPPED | OUTPATIENT
Start: 2022-02-23 | End: 2022-03-22

## 2022-03-22 DIAGNOSIS — I10 ESSENTIAL HYPERTENSION: ICD-10-CM

## 2022-03-22 DIAGNOSIS — K21.9 GASTROESOPHAGEAL REFLUX DISEASE WITHOUT ESOPHAGITIS: ICD-10-CM

## 2022-03-22 DIAGNOSIS — Z12.31 ENCOUNTER FOR SCREENING MAMMOGRAM FOR MALIGNANT NEOPLASM OF BREAST: ICD-10-CM

## 2022-03-22 RX ORDER — FLUTICASONE PROPIONATE 50 MCG
SPRAY, SUSPENSION (ML) NASAL
Qty: 16 G | Refills: 0 | Status: SHIPPED | OUTPATIENT
Start: 2022-03-22 | End: 2022-04-21

## 2022-03-22 RX ORDER — PANTOPRAZOLE SODIUM 40 MG/1
TABLET, DELAYED RELEASE ORAL
Qty: 30 TABLET | Refills: 0 | Status: SHIPPED | OUTPATIENT
Start: 2022-03-22 | End: 2022-04-21

## 2022-03-22 RX ORDER — AMLODIPINE BESYLATE 5 MG/1
TABLET ORAL
Qty: 30 TABLET | Refills: 0 | Status: SHIPPED | OUTPATIENT
Start: 2022-03-22 | End: 2022-04-22 | Stop reason: SDUPTHER

## 2022-04-20 DIAGNOSIS — I10 ESSENTIAL HYPERTENSION: ICD-10-CM

## 2022-04-20 DIAGNOSIS — K21.9 GASTROESOPHAGEAL REFLUX DISEASE WITHOUT ESOPHAGITIS: ICD-10-CM

## 2022-04-20 RX ORDER — AMLODIPINE BESYLATE 5 MG/1
5 TABLET ORAL DAILY
Qty: 30 TABLET | Refills: 0 | Status: CANCELLED | OUTPATIENT
Start: 2022-04-20

## 2022-04-20 RX ORDER — PANTOPRAZOLE SODIUM 40 MG/1
40 TABLET, DELAYED RELEASE ORAL DAILY
Qty: 30 TABLET | Refills: 0 | Status: CANCELLED | OUTPATIENT
Start: 2022-04-20

## 2022-04-21 DIAGNOSIS — Z12.31 ENCOUNTER FOR SCREENING MAMMOGRAM FOR MALIGNANT NEOPLASM OF BREAST: ICD-10-CM

## 2022-04-21 DIAGNOSIS — I10 ESSENTIAL HYPERTENSION: ICD-10-CM

## 2022-04-21 DIAGNOSIS — K21.9 GASTROESOPHAGEAL REFLUX DISEASE WITHOUT ESOPHAGITIS: ICD-10-CM

## 2022-04-21 RX ORDER — PANTOPRAZOLE SODIUM 40 MG/1
TABLET, DELAYED RELEASE ORAL
Qty: 30 TABLET | Refills: 0 | Status: SHIPPED | OUTPATIENT
Start: 2022-04-21 | End: 2022-05-09

## 2022-04-21 RX ORDER — FLUTICASONE PROPIONATE 50 MCG
SPRAY, SUSPENSION (ML) NASAL
Qty: 16 G | Refills: 0 | Status: SHIPPED | OUTPATIENT
Start: 2022-04-21 | End: 2022-04-25

## 2022-04-22 DIAGNOSIS — I10 ESSENTIAL HYPERTENSION: ICD-10-CM

## 2022-04-23 ENCOUNTER — PATIENT MESSAGE (OUTPATIENT)
Dept: FAMILY MEDICINE CLINIC | Facility: CLINIC | Age: 67
End: 2022-04-23

## 2022-04-23 RX ORDER — AMLODIPINE BESYLATE 5 MG/1
5 TABLET ORAL DAILY
Qty: 15 TABLET | Refills: 0 | Status: SHIPPED | OUTPATIENT
Start: 2022-04-23 | End: 2022-05-09 | Stop reason: SDUPTHER

## 2022-04-23 NOTE — TELEPHONE ENCOUNTER
From: Mireya Gonzalez  To: Lulu Mendoza DO  Sent: 4/23/2022 5:42 AM EDT  Subject: Amlodipine    Hi, I never received my prescription for Amlodipine 5mg the other day  Please call in soon, I only have a few left   Thanks

## 2022-04-23 NOTE — TELEPHONE ENCOUNTER
Only will refill 2 week supply  Must be seen in that timeframe to continue to get refills  BP was elevated at last visit 6 months ago

## 2022-04-25 DIAGNOSIS — Z12.31 ENCOUNTER FOR SCREENING MAMMOGRAM FOR MALIGNANT NEOPLASM OF BREAST: ICD-10-CM

## 2022-04-25 RX ORDER — FLUTICASONE PROPIONATE 50 MCG
SPRAY, SUSPENSION (ML) NASAL
Qty: 16 G | Refills: 0 | Status: SHIPPED | OUTPATIENT
Start: 2022-04-25

## 2022-05-09 ENCOUNTER — OFFICE VISIT (OUTPATIENT)
Dept: FAMILY MEDICINE CLINIC | Facility: CLINIC | Age: 67
End: 2022-05-09
Payer: MEDICARE

## 2022-05-09 ENCOUNTER — TELEPHONE (OUTPATIENT)
Dept: FAMILY MEDICINE CLINIC | Facility: CLINIC | Age: 67
End: 2022-05-09

## 2022-05-09 VITALS
OXYGEN SATURATION: 98 % | HEART RATE: 74 BPM | SYSTOLIC BLOOD PRESSURE: 132 MMHG | TEMPERATURE: 96.8 F | BODY MASS INDEX: 25.07 KG/M2 | DIASTOLIC BLOOD PRESSURE: 72 MMHG | WEIGHT: 143.8 LBS | RESPIRATION RATE: 20 BRPM

## 2022-05-09 DIAGNOSIS — K21.9 GASTROESOPHAGEAL REFLUX DISEASE WITHOUT ESOPHAGITIS: ICD-10-CM

## 2022-05-09 DIAGNOSIS — Z13.820 ENCOUNTER FOR SCREENING FOR OSTEOPOROSIS: ICD-10-CM

## 2022-05-09 DIAGNOSIS — G89.29 CHRONIC PAIN OF LEFT KNEE: ICD-10-CM

## 2022-05-09 DIAGNOSIS — E66.3 OVERWEIGHT (BMI 25.0-29.9): ICD-10-CM

## 2022-05-09 DIAGNOSIS — Z12.31 ENCOUNTER FOR SCREENING MAMMOGRAM FOR MALIGNANT NEOPLASM OF BREAST: ICD-10-CM

## 2022-05-09 DIAGNOSIS — I10 ESSENTIAL HYPERTENSION: Primary | ICD-10-CM

## 2022-05-09 DIAGNOSIS — Z12.11 SCREENING FOR COLON CANCER: ICD-10-CM

## 2022-05-09 DIAGNOSIS — M25.562 CHRONIC PAIN OF LEFT KNEE: ICD-10-CM

## 2022-05-09 DIAGNOSIS — F33.9 EPISODE OF RECURRENT MAJOR DEPRESSIVE DISORDER, UNSPECIFIED DEPRESSION EPISODE SEVERITY (HCC): ICD-10-CM

## 2022-05-09 PROBLEM — Z00.00 HEALTHCARE MAINTENANCE: Status: ACTIVE | Noted: 2022-05-09

## 2022-05-09 LAB
CREAT UR-MCNC: 58.9 MG/DL
MICROALBUMIN UR-MCNC: 10.7 MG/L (ref 0–20)
MICROALBUMIN/CREAT 24H UR: 18 MG/G CREATININE (ref 0–30)

## 2022-05-09 PROCEDURE — 82043 UR ALBUMIN QUANTITATIVE: CPT | Performed by: FAMILY MEDICINE

## 2022-05-09 PROCEDURE — 99214 OFFICE O/P EST MOD 30 MIN: CPT | Performed by: FAMILY MEDICINE

## 2022-05-09 PROCEDURE — 82570 ASSAY OF URINE CREATININE: CPT | Performed by: FAMILY MEDICINE

## 2022-05-09 RX ORDER — PANTOPRAZOLE SODIUM 40 MG/1
40 TABLET, DELAYED RELEASE ORAL DAILY
Qty: 90 TABLET | Refills: 0 | Status: SHIPPED | OUTPATIENT
Start: 2022-05-09

## 2022-05-09 RX ORDER — FLUOXETINE 10 MG/1
10 CAPSULE ORAL DAILY
Qty: 30 CAPSULE | Refills: 0 | Status: SHIPPED | OUTPATIENT
Start: 2022-05-09

## 2022-05-09 RX ORDER — AMLODIPINE BESYLATE 5 MG/1
5 TABLET ORAL DAILY
Qty: 90 TABLET | Refills: 0 | Status: SHIPPED | OUTPATIENT
Start: 2022-05-09

## 2022-05-09 RX ORDER — IRBESARTAN 150 MG/1
150 TABLET ORAL DAILY
Qty: 90 TABLET | Refills: 0 | Status: SHIPPED | OUTPATIENT
Start: 2022-05-09

## 2022-05-09 NOTE — ASSESSMENT & PLAN NOTE
 H/o depression on prozac; no h/o SI/HI and none currently   Symptoms include low energy, anhedonia, little interest in doing things, and low motivation    PLAN  · Will start prozac 10mg daily  · Discussed importance for daily compliance for therapeutic benefit  · Discussed most common potential side effects including GI upset and reduced libido, but we mitigate those side effects by starting at a low dose  · Discussed potential for GI bleed when taken with NSAIDs and to monitor for symptoms of this  · Discussed importance of lifestyle change to help elevate mood (social connectedness, healthy eating, and physical activity)  · Discuss therapy at future visit  · Will follow up in 4-6 weeks - can be virtual

## 2022-05-09 NOTE — ASSESSMENT & PLAN NOTE
 Evidence of OA on x-ray of the L knee in 2021   Wears knee brace   Not physically active; would benefit from PT/increased physical activity   May need TKR vs corticosteroid injection to help mitigate pain and encourage activity; however she is resistant in spite of our discussion     PLAN  · Readdress injections vs TKR at future visit  · Continue to encourage regular physical activity and healthy diet to help promote mobility, reduce inflammation, and mitigate pain and reduce stiffness

## 2022-05-09 NOTE — PROGRESS NOTES
Assessment & Plan     Healthcare maintenance   Screening:  o Cancer: mammogram and colonoscopy ordered  o Diabetes: cmp ordered  o HLD: discuss ordering at annual physical  o Hep C: tested negative 6/2021 - low risk patient  o HIV: declined in the past; low risk  o Osteoporosis: ordered DXA  o Sexual health: discuss at annual physical   SOGI: discuss at annual physical   Immunizations: declined at this visit - discuss at future visits   Annual Physical: not due yet          Left knee pain   Evidence of OA on x-ray of the L knee in 2021   Wears knee brace   Not physically active; would benefit from PT/increased physical activity   May need TKR vs corticosteroid injection to help mitigate pain and encourage activity; however she is resistant in spite of our discussion     PLAN  · Readdress injections vs TKR at future visit  · Continue to encourage regular physical activity and healthy diet to help promote mobility, reduce inflammation, and mitigate pain and reduce stiffness      Essential hypertension   BP well controlled on current dose    PLAN  · Recommend follow up with opthomology  · Check BP at home and call office for symptoms of hypo or hypertension  · Obtain CMP and microalbumin/creatinine ratio  · Continue current dose and send 3 month refills      Episode of recurrent major depressive disorder ([facility] [address] )   H/o depression on prozac; no h/o SI/HI and none currently   Symptoms include low energy, anhedonia, little interest in doing things, and low motivation    PLAN  · Will start prozac 10mg daily  · Discussed importance for daily compliance for therapeutic benefit  · Discussed most common potential side effects including GI upset and reduced libido, but we mitigate those side effects by starting at a low dose  · Discussed potential for GI bleed when taken with NSAIDs and to monitor for symptoms of this  · Discussed importance of lifestyle change to help elevate mood (social connectedness, healthy eating, and physical activity)  · Discuss therapy at future visit  · Will follow up in 4-6 weeks - can be virtual             Diagnoses and all orders for this visit:    Essential hypertension  -     Microalbumin / creatinine urine ratio  -     Comprehensive metabolic panel; Future  -     amLODIPine (NORVASC) 5 mg tablet; Take 1 tablet (5 mg total) by mouth daily  -     irbesartan (AVAPRO) 150 mg tablet; Take 1 tablet (150 mg total) by mouth daily    Encounter for screening mammogram for malignant neoplasm of breast  -     Mammo screening bilateral w 3d & cad; Future    Screening for colon cancer  -     Ambulatory referral for colonoscopy; Future    Encounter for screening for osteoporosis  -     DXA bone density spine hip and pelvis; Future    Episode of recurrent major depressive disorder, unspecified depression episode severity (HCC)  -     FLUoxetine (PROzac) 10 mg capsule; Take 1 capsule (10 mg total) by mouth daily    Gastroesophageal reflux disease without esophagitis  -     pantoprazole (PROTONIX) 40 mg tablet; Take 1 tablet (40 mg total) by mouth daily    Overweight (BMI 25 0-29  9)    Chronic pain of left knee      Nutrition Assessment and Intervention:     Reviewed food recall journal      Physical Activity Assessment and Intervention:    Activity journal reviewed      Emotional and Mental Well-being, Sleep, Connectedness Assessment and Intervention:    PHQ-9 or GAD7 performed for initial evaluation or follow-up      Tobacco and Toxic Substance Assessment and Intervention:     Tobacco use screening performed    Alcohol and drug use screening performed      Therapeutic Lifestyle Change Visit:     One-on-one comprehensive counseling, coaching, and health behavior change visit completed             Return in about 3 weeks (around 5/30/2022) for Recheck anxiety         History of Present Illness     Chief Complaint   Patient presents with    Follow-up       Abhijit Fernandez is a 75 y o  female with HTN who presents today for follow up for BP  HTN  Occasionally checks BP at home which is well controlled in the [id number] systolic  Denies symptoms of too high or too low BP  Compliant with current medications  OA knee  Has severe knee pain that limits activity  Feels like activity worsens knee pain  Not interested in corticosteroid injection or surgery, even though these may help her be more active, which is something she wishes she could do  Her "cons list" always outweighs the pros she says  Discussed risks/benefits of CSI and surgery  Still not interested in either at this time  Depression  Feels depressed, has longstanding history of this  Was on prozac in the past and feels this helped  Stopped taking buspar  Main concern is low motivation  She doesn't feel motivated to walk or do things  Knee pain also prevents her from being active  This in turn has caused her to snack a lot and eat junk food, and then she feels worse and less motivated to do anything  No good support system  Has her "facebook friends" but they are "all busy" although she has never actively tried to spend time with them  Not interested in making social connections  Denies drinking or smoking  Denies SI/HI  Review of Systems     Review of Systems   Constitutional: Negative for chills, fever and unexpected weight change  HENT: Negative for congestion, rhinorrhea and sore throat  Eyes: Positive for visual disturbance (due to cataracts)  Negative for pain  Respiratory: Negative for cough and shortness of breath  Cardiovascular: Negative for chest pain and leg swelling  Gastrointestinal: Negative for abdominal pain, constipation, diarrhea, nausea and vomiting  Endocrine: Negative for polydipsia and polyuria  Genitourinary: Negative for dysuria and hematuria  Musculoskeletal: Positive for arthralgias  Negative for joint swelling  Skin: Negative for rash and wound  Neurological: Negative for headaches  Psychiatric/Behavioral: Positive for dysphoric mood  Negative for suicidal ideas  The patient is nervous/anxious  The following portions of the patient's history were reviewed and updated as appropriate: allergies, current medications, past family history, past medical history, past social history, past surgical history and problem list     Objective     /72 (BP Location: Right arm, Patient Position: Sitting, Cuff Size: Standard)   Pulse 74   Temp (!) 96 8 °F (36 °C)   Resp 20   Wt 65 2 kg (143 lb 12 8 oz)   SpO2 98%   BMI 25 07 kg/m²      Physical Exam  Vitals and nursing note reviewed  Constitutional:       General: She is not in acute distress  Appearance: Normal appearance  She is well-developed  She is not ill-appearing, toxic-appearing or diaphoretic  HENT:      Head: Normocephalic and atraumatic  Right Ear: External ear normal       Left Ear: External ear normal       Nose: Nose normal    Eyes:      General: No scleral icterus  Right eye: No discharge  Left eye: No discharge  Conjunctiva/sclera: Conjunctivae normal    Neck:      Trachea: No tracheal deviation  Cardiovascular:      Rate and Rhythm: Normal rate and regular rhythm  Heart sounds: Normal heart sounds  No murmur heard  Pulmonary:      Effort: Pulmonary effort is normal  No respiratory distress  Breath sounds: Normal breath sounds  No stridor  No wheezing or rales  Abdominal:      General: Bowel sounds are normal       Palpations: Abdomen is soft  Tenderness: There is no abdominal tenderness  There is no guarding or rebound  Musculoskeletal:      Cervical back: Normal range of motion and neck supple  Right lower leg: No edema  Left lower leg: No edema  Skin:     General: Skin is warm  Capillary Refill: Capillary refill takes less than 2 seconds  Findings: No rash  Neurological:      Mental Status: She is alert     Psychiatric:         Mood and Affect: Mood normal          Behavior: Behavior normal            Signature:   Nicole Grady 162, PGY-3  05/09/22     BMI Counseling: Body mass index is 25 07 kg/m²  The BMI is above normal  Nutrition recommendations include 3-5 servings of fruits/vegetables daily, consuming healthier snacks and decreasing soda and/or juice intake  Exercise recommendations include moderate aerobic physical activity for 150 minutes/week

## 2022-05-09 NOTE — ASSESSMENT & PLAN NOTE
 BP well controlled on current dose    PLAN  · Recommend follow up with opthomology  · Check BP at home and call office for symptoms of hypo or hypertension  · Obtain CMP and microalbumin/creatinine ratio  · Continue current dose and send 3 month refills

## 2022-05-09 NOTE — ASSESSMENT & PLAN NOTE
 Screening:  o Cancer: mammogram and colonoscopy ordered  o Diabetes: cmp ordered  o HLD: discuss ordering at annual physical  o Hep C: tested negative 6/2021 - low risk patient  o HIV: declined in the past; low risk  o Osteoporosis: ordered DXA  o Sexual health: discuss at annual physical   SOGI: discuss at annual physical   Immunizations: declined at this visit - discuss at future visits   Annual Physical: not due yet

## 2022-05-10 ENCOUNTER — TELEPHONE (OUTPATIENT)
Dept: LAB | Facility: HOSPITAL | Age: 67
End: 2022-05-10

## 2022-05-12 NOTE — TELEPHONE ENCOUNTER
5/12/22 - Note to chart:  Pt LM asking if Fasting was required for her upcoming testing  Order says "Fasting", so LM for pt asking her to call and advise if she had any other questions/concerns

## 2022-05-26 ENCOUNTER — TELEPHONE (OUTPATIENT)
Dept: LAB | Facility: HOSPITAL | Age: 67
End: 2022-05-26

## 2022-06-02 ENCOUNTER — APPOINTMENT (OUTPATIENT)
Dept: LAB | Facility: HOSPITAL | Age: 67
End: 2022-06-02
Payer: MEDICARE

## 2022-06-03 ENCOUNTER — PATIENT MESSAGE (OUTPATIENT)
Dept: FAMILY MEDICINE CLINIC | Facility: CLINIC | Age: 67
End: 2022-06-03

## 2022-06-06 NOTE — TELEPHONE ENCOUNTER
From: Anton Patricia  To: Kassidy Garza,   Sent: 6/3/2022 5:21 AM EDT  Subject: Test results    What do my test results mean and why are they low? I went off the amlodipine

## 2022-07-25 ENCOUNTER — VBI (OUTPATIENT)
Dept: ADMINISTRATIVE | Facility: OTHER | Age: 67
End: 2022-07-25

## 2022-08-23 ENCOUNTER — OFFICE VISIT (OUTPATIENT)
Dept: FAMILY MEDICINE CLINIC | Facility: CLINIC | Age: 67
End: 2022-08-23
Payer: MEDICARE

## 2022-08-23 VITALS
BODY MASS INDEX: 24.45 KG/M2 | WEIGHT: 140.25 LBS | SYSTOLIC BLOOD PRESSURE: 142 MMHG | HEART RATE: 85 BPM | DIASTOLIC BLOOD PRESSURE: 92 MMHG | OXYGEN SATURATION: 99 % | TEMPERATURE: 98 F

## 2022-08-23 DIAGNOSIS — K21.9 GASTROESOPHAGEAL REFLUX DISEASE WITHOUT ESOPHAGITIS: ICD-10-CM

## 2022-08-23 DIAGNOSIS — I10 ESSENTIAL HYPERTENSION: ICD-10-CM

## 2022-08-23 PROCEDURE — G0439 PPPS, SUBSEQ VISIT: HCPCS | Performed by: CHIROPRACTOR

## 2022-08-23 RX ORDER — AMLODIPINE BESYLATE 5 MG/1
5 TABLET ORAL DAILY
Qty: 90 TABLET | Refills: 0 | Status: SHIPPED | OUTPATIENT
Start: 2022-08-23

## 2022-08-23 RX ORDER — PANTOPRAZOLE SODIUM 40 MG/1
40 TABLET, DELAYED RELEASE ORAL DAILY
Qty: 90 TABLET | Refills: 0 | Status: SHIPPED | OUTPATIENT
Start: 2022-08-23

## 2022-08-23 RX ORDER — IRBESARTAN 150 MG/1
150 TABLET ORAL DAILY
Qty: 90 TABLET | Refills: 0 | Status: SHIPPED | OUTPATIENT
Start: 2022-08-23

## 2022-08-23 NOTE — PROGRESS NOTES
Assessment and Plan:     Problem List Items Addressed This Visit    None          Preventive health issues were discussed with patient, and age appropriate screening tests were ordered as noted in patient's After Visit Summary  Personalized health advice and appropriate referrals for health education or preventive services given if needed, as noted in patient's After Visit Summary  History of Present Illness:     Patient presents for a Medicare Wellness Visit     Patient presents today for annual wellness examination  By the patient's own admission, she has not been taking good care of herself  Spends most of her days on the Internet between the phone and desktop  She is finding it extremely difficult to break out of this habit which got worse during the pandemic  Her PHQ 9 is 17  When asked about seeing behavioral therapy she refuses at this time  Stating she sent before did not find helpful at all  Considerable time of today's visit was spent counseling the patient with regard to setting boundaries for herself and making healthy changes 1 step at a time  Patient will follow-up again in 4 weeks or prior to that if necessary  Patient was also advised to begin taking her BP once again on a regular basis initially daily because she has not been taking her amlodipine regularly  She has been advised to notify us of any significant elevations or changes in her BP  Patient Care Team:  Asia Reece DO as PCP - General (Family Medicine)     Review of Systems:     Review of Systems   Musculoskeletal: Positive for arthralgias (L Knee)          Problem List:     Patient Active Problem List   Diagnosis    Essential hypertension    Anxiety    Gastroesophageal reflux disease without esophagitis    Lightheadedness    Left knee pain    Episode of recurrent major depressive disorder ([facility] [address] )    Healthcare maintenance      Past Medical and Surgical History:     Past Medical History:   Diagnosis Date    Anxiety  Arthritis     left knee    Depression     Hypertension     Migraines     Rhinitis     Rosacea      Past Surgical History:   Procedure Laterality Date    BREAST LUMPECTOMY Left     pathology benign    CYST REMOVAL  2019    Excision of left axillary sebaceous cyst 2cm      Family History:     Family History   Problem Relation Age of Onset    Stroke Mother     Thrombocytopenia Mother     Other Father         neoplasm of brain     Brain cancer Father     Hypertension Father     Lung cancer Sister     Lung cancer Sister     Lupus Sister     Cerebral aneurysm Sister       Social History:     Social History     Socioeconomic History    Marital status:      Spouse name: Not on file    Number of children: Not on file    Years of education: Not on file    Highest education level: Not on file   Occupational History    Occupation: retired   Tobacco Use    Smoking status: Former Smoker     Packs/day:      Years: 20      Pack years: 20 00     Types: Cigarettes     Quit date:      Years since quittin 6    Smokeless tobacco: Never Used   Substance and Sexual Activity    Alcohol use: Not Currently    Drug use: Never    Sexual activity: Not on file   Other Topics Concern    Not on file   Social History Narrative    declined for colonoscopy/FOBT    Most recent tobacco use screenin2019      Advance directive:   Yes, living will     As per [place]      Social Determinants of Health     Financial Resource Strain: Not on file   Food Insecurity: Not on file   Transportation Needs: Not on file   Physical Activity: Not on file   Stress: Not on file   Social Connections: Not on file   Intimate Partner Violence: Not on file   Housing Stability: Not on file      Medications and Allergies:     Current Outpatient Medications   Medication Sig Dispense Refill    amLODIPine (NORVASC) 5 mg tablet Take 1 tablet (5 mg total) by mouth daily 90 tablet 0    FLUoxetine (PROzac) 10 mg capsule Take 1 capsule (10 mg total) by mouth daily 30 capsule 0    fluticasone (FLONASE) 50 mcg/act nasal spray Use 2 spray(s) in each nostril once daily 16 g 0    irbesartan (AVAPRO) 150 mg tablet Take 1 tablet (150 mg total) by mouth daily 90 tablet 0    pantoprazole (PROTONIX) 40 mg tablet Take 1 tablet (40 mg total) by mouth daily 90 tablet 0     No current facility-administered medications for this visit  Allergies   Allergen Reactions    Azithromycin Hives and Itching    Clindamycin Hives    Penicillins Hives and Itching      Immunizations:     Immunization History   Administered Date(s) Administered    COVID-19 PFIZER VACCINE 0 3 ML IM 02/24/2021, 03/16/2021    Influenza Split High Dose Preservative Free IM 09/01/2020    Influenza, high dose seasonal 0 7 mL 10/19/2021    Pneumococcal Conjugate 13-Valent 09/15/2020      Health Maintenance:         Topic Date Due    Colorectal Cancer Screening  Never done    Breast Cancer Screening: Mammogram  10/25/2020    Hepatitis C Screening  Completed         Topic Date Due    COVID-19 Vaccine (3 - Booster for Pfizer series) 08/16/2021    Pneumococcal Vaccine: 65+ Years (2 - PPSV23 or PCV20) 09/15/2021    Influenza Vaccine (1) 09/01/2022      Medicare Screening Tests and Risk Assessments:         Health Risk Assessment:   Patient rates overall health as good  Patient feels that their physical health rating is same  Patient is dissatisfied with their life  Eyesight was rated as slightly worse  Hearing was rated as same  Patient feels that their emotional and mental health rating is same  Patients states they are sometimes angry  Patient states they are always unusually tired/fatigued  Pain experienced in the last 7 days has been some  Patient's pain rating has been 6/10  Patient states that she has experienced no weight loss or gain in last 6 months  Depression Screening:   PHQ-9 Score: 17      Fall Risk Screening:    In the past year, patient has experienced: no history of falling in past year      Urinary Incontinence Screening:   Patient has not leaked urine accidently in the last six months  Home Safety:  Patient has trouble with stairs inside or outside of their home  Patient has working smoke alarms and has working carbon monoxide detector  Home safety hazards include: none  Nutrition:   Current diet is Regular  Medications:   Patient is currently taking over-the-counter supplements  OTC medications include: see medication list  Patient is able to manage medications  Activities of Daily Living (ADLs)/Instrumental Activities of Daily Living (IADLs):   Walk and transfer into and out of bed and chair?: Yes  Dress and groom yourself?: Yes    Bathe or shower yourself?: Yes    Feed yourself?  Yes  Do your laundry/housekeeping?: Yes  Manage your money, pay your bills and track your expenses?: Yes  Make your own meals?: Yes    Do your own shopping?: Yes    Previous Hospitalizations:   Any hospitalizations or ED visits within the last 12 months?: No      PREVENTIVE SCREENINGS        Diabetes Screening:     General: Screening Current      Cervical Cancer Screening:    General: Screening Not Indicated      Lung Cancer Screening:     General: Screening Not Indicated      Hepatitis C Screening:    General: Screening Current    Screening, Brief Intervention, and Referral to Treatment (SBIRT)    Screening      Single Item Drug Screening:  How often have you used an illegal drug (including marijuana) or a prescription medication for non-medical reasons in the past year? never    Single Item Drug Screen Score: 0  Interpretation: Negative screen for possible drug use disorder    No exam data present     Physical Exam:     Temp 98 °F (36 7 °C) (Temporal)   Wt 63 6 kg (140 lb 4 oz)   BMI 24 45 kg/m²     Physical Exam Left patellofemoral tenderness and crepitation noted during ROM exercises    Mishel Cody MD

## 2022-08-23 NOTE — PATIENT INSTRUCTIONS
Medicare Preventive Visit Patient Instructions  Thank you for completing your Welcome to Medicare Visit or Medicare Annual Wellness Visit today  Your next wellness visit will be due in one year (8/24/2023)  The screening/preventive services that you may require over the next 5-10 years are detailed below  Some tests may not apply to you based off risk factors and/or age  Screening tests ordered at today's visit but not completed yet may show as past due  Also, please note that scanned in results may not display below  Preventive Screenings:  Service Recommendations Previous Testing/Comments   Colorectal Cancer Screening  * Colonoscopy    * Fecal Occult Blood Test (FOBT)/Fecal Immunochemical Test (FIT)  * Fecal DNA/Cologuard Test  * Flexible Sigmoidoscopy Age: 39-70 years old   Colonoscopy: every 10 years (may be performed more frequently if at higher risk)  OR  FOBT/FIT: every 1 year  OR  Cologuard: every 3 years  OR  Sigmoidoscopy: every 5 years  Screening may be recommended earlier than age 39 if at higher risk for colorectal cancer  Also, an individualized decision between you and your healthcare provider will decide whether screening between the ages of 74-80 would be appropriate  Colonoscopy: Not on file  FOBT/FIT: Not on file  Cologuard: Not on file  Sigmoidoscopy: Not on file          Breast Cancer Screening Age: 36 years old  Frequency: every 1-2 years  Not required if history of left and right mastectomy Mammogram: 10/25/2019        Cervical Cancer Screening Between the ages of 21-29, pap smear recommended once every 3 years  Between the ages of 33-67, can perform pap smear with HPV co-testing every 5 years     Recommendations may differ for women with a history of total hysterectomy, cervical cancer, or abnormal pap smears in past  Pap Smear: Not on file    Screening Not Indicated   Hepatitis C Screening Once for adults born between [facility]  More frequently in patients at high risk for Hepatitis C Hep C Antibody: 06/08/2021    Screening Current   Diabetes Screening 1-2 times per year if you're at risk for diabetes or have pre-diabetes Fasting glucose: No results in last 5 years (No results in last 5 years)  A1C: No results in last 5 years (No results in last 5 years)  Screening Current   Cholesterol Screening Once every 5 years if you don't have a lipid disorder  May order more often based on risk factors  Lipid panel: Not on file          Other Preventive Screenings Covered by Medicare:  1  Abdominal Aortic Aneurysm (AAA) Screening: covered once if your at risk  You're considered to be at risk if you have a family history of AAA  2  Lung Cancer Screening: covers low dose CT scan once per year if you meet all of the following conditions: (1) Age 50-69; (2) No signs or symptoms of lung cancer; (3) Current smoker or have quit smoking within the last 15 years; (4) You have a tobacco smoking history of at least 20 pack years (packs per day multiplied by number of years you smoked); (5) You get a written order from a healthcare provider  3  Glaucoma Screening: covered annually if you're considered high risk: (1) You have diabetes OR (2) Family history of glaucoma OR (3)  aged 48 and older OR (3)  American aged 72 and older  3  Osteoporosis Screening: covered every 2 years if you meet one of the following conditions: (1) You're estrogen deficient and at risk for osteoporosis based off medical history and other findings; (2) Have a vertebral abnormality; (3) On glucocorticoid therapy for more than 3 months; (4) Have primary hyperparathyroidism; (5) On osteoporosis medications and need to assess response to drug therapy  · Last bone density test (DXA Scan): 10/25/2019   5  HIV Screening: covered annually if you're between the age of 15-65  Also covered annually if you are younger than 13 and older than 72 with risk factors for HIV infection   For pregnant patients, it is covered up to 3 times per pregnancy  Immunizations:  Immunization Recommendations   Influenza Vaccine Annual influenza vaccination during flu season is recommended for all persons aged >= 6 months who do not have contraindications   Pneumococcal Vaccine   * Pneumococcal conjugate vaccine = PCV13 (Prevnar 13), PCV15 (Vaxneuvance), PCV20 (Prevnar 20)  * Pneumococcal polysaccharide vaccine = PPSV23 (Pneumovax) Adults 25-60 years old: 1-3 doses may be recommended based on certain risk factors  Adults 72 years old: 1-2 doses may be recommended based off what pneumonia vaccine you previously received   Hepatitis B Vaccine 3 dose series if at intermediate or high risk (ex: diabetes, end stage renal disease, liver disease)   Tetanus (Td) Vaccine - COST NOT COVERED BY MEDICARE PART B Following completion of primary series, a booster dose should be given every 10 years to maintain immunity against tetanus  Td may also be given as tetanus wound prophylaxis  Tdap Vaccine - COST NOT COVERED BY MEDICARE PART B Recommended at least once for all adults  For pregnant patients, recommended with each pregnancy  Shingles Vaccine (Shingrix) - COST NOT COVERED BY MEDICARE PART B  2 shot series recommended in those aged 48 and above     Health Maintenance Due:      Topic Date Due    Colorectal Cancer Screening  Never done    Breast Cancer Screening: Mammogram  10/25/2020    Hepatitis C Screening  Completed     Immunizations Due:      Topic Date Due    COVID-19 Vaccine (3 - Booster for Pfizer series) 08/16/2021    Pneumococcal Vaccine: 65+ Years (2 - PPSV23 or PCV20) 09/15/2021    Influenza Vaccine (1) 09/01/2022     Advance Directives   What are advance directives? Advance directives are legal documents that state your wishes and plans for medical care  These plans are made ahead of time in case you lose your ability to make decisions for yourself   Advance directives can apply to any medical decision, such as the treatments you want, and if you want to donate organs  What are the types of advance directives? There are many types of advance directives, and each state has rules about how to use them  You may choose a combination of any of the following:  · Living will: This is a written record of the treatment you want  You can also choose which treatments you do not want, which to limit, and which to stop at a certain time  This includes surgery, medicine, IV fluid, and tube feedings  · Durable power of  for healthcare [facility]): This is a written record that states who you want to make healthcare choices for you when you are unable to make them for yourself  This person, called a proxy, is usually a family member or a friend  You may choose more than 1 proxy  · Do not resuscitate (DNR) order:  A DNR order is used in case your heart stops beating or you stop breathing  It is a request not to have certain forms of treatment, such as CPR  A DNR order may be included in other types of advance directives  · Medical directive: This covers the care that you want if you are in a coma, near death, or unable to make decisions for yourself  You can list the treatments you want for each condition  Treatment may include pain medicine, surgery, blood transfusions, dialysis, IV or tube feedings, and a ventilator (breathing machine)  · Values history: This document has questions about your views, beliefs, and how you feel and think about life  This information can help others choose the care that you would choose  Why are advance directives important? An advance directive helps you control your care  Although spoken wishes may be used, it is better to have your wishes written down  Spoken wishes can be misunderstood, or not followed  Treatments may be given even if you do not want them  An advance directive may make it easier for your family to make difficult choices about your care         © Copyright [employer] 2018 Information is for End User's use only and may not be sold, redistributed or otherwise used for commercial purposes   All illustrations and images included in CareNotes® are the copyrighted property of A D A M , Inc  or Carley Pina

## 2022-10-11 PROBLEM — Z00.00 HEALTHCARE MAINTENANCE: Status: RESOLVED | Noted: 2022-05-09 | Resolved: 2022-10-11

## 2022-11-07 DIAGNOSIS — K21.9 GASTROESOPHAGEAL REFLUX DISEASE WITHOUT ESOPHAGITIS: ICD-10-CM

## 2022-11-07 DIAGNOSIS — I10 ESSENTIAL HYPERTENSION: ICD-10-CM

## 2022-11-07 RX ORDER — PANTOPRAZOLE SODIUM 40 MG/1
40 TABLET, DELAYED RELEASE ORAL DAILY
Qty: 90 TABLET | Refills: 0 | Status: SHIPPED | OUTPATIENT
Start: 2022-11-07

## 2022-11-07 RX ORDER — AMLODIPINE BESYLATE 5 MG/1
5 TABLET ORAL DAILY
Qty: 90 TABLET | Refills: 0 | Status: SHIPPED | OUTPATIENT
Start: 2022-11-07

## 2022-11-07 RX ORDER — IRBESARTAN 150 MG/1
150 TABLET ORAL DAILY
Qty: 90 TABLET | Refills: 0 | Status: SHIPPED | OUTPATIENT
Start: 2022-11-07

## 2023-02-13 DIAGNOSIS — F33.9 EPISODE OF RECURRENT MAJOR DEPRESSIVE DISORDER, UNSPECIFIED DEPRESSION EPISODE SEVERITY (HCC): ICD-10-CM

## 2023-02-13 DIAGNOSIS — I10 ESSENTIAL HYPERTENSION: ICD-10-CM

## 2023-02-13 DIAGNOSIS — K21.9 GASTROESOPHAGEAL REFLUX DISEASE WITHOUT ESOPHAGITIS: ICD-10-CM

## 2023-02-13 RX ORDER — FLUOXETINE 10 MG/1
10 CAPSULE ORAL DAILY
Qty: 30 CAPSULE | Refills: 0 | Status: SHIPPED | OUTPATIENT
Start: 2023-02-13

## 2023-02-13 RX ORDER — AMLODIPINE BESYLATE 5 MG/1
TABLET ORAL
Qty: 30 TABLET | Refills: 0 | Status: SHIPPED | OUTPATIENT
Start: 2023-02-13

## 2023-02-13 RX ORDER — IRBESARTAN 150 MG/1
150 TABLET ORAL DAILY
Qty: 90 TABLET | Refills: 0 | Status: SHIPPED | OUTPATIENT
Start: 2023-02-13

## 2023-02-13 RX ORDER — PANTOPRAZOLE SODIUM 40 MG/1
40 TABLET, DELAYED RELEASE ORAL DAILY
Qty: 90 TABLET | Refills: 0 | Status: SHIPPED | OUTPATIENT
Start: 2023-02-13

## 2023-02-13 RX ORDER — AMLODIPINE BESYLATE 5 MG/1
5 TABLET ORAL DAILY
Qty: 90 TABLET | Refills: 0 | Status: SHIPPED | OUTPATIENT
Start: 2023-02-13 | End: 2023-02-13 | Stop reason: SDUPTHER

## 2023-05-01 DIAGNOSIS — K21.9 GASTROESOPHAGEAL REFLUX DISEASE WITHOUT ESOPHAGITIS: ICD-10-CM

## 2023-05-01 DIAGNOSIS — I10 ESSENTIAL HYPERTENSION: ICD-10-CM

## 2023-05-03 RX ORDER — PANTOPRAZOLE SODIUM 40 MG/1
40 TABLET, DELAYED RELEASE ORAL DAILY
Qty: 90 TABLET | Refills: 0 | Status: SHIPPED | OUTPATIENT
Start: 2023-05-03

## 2023-05-03 RX ORDER — AMLODIPINE BESYLATE 5 MG/1
5 TABLET ORAL DAILY
Qty: 30 TABLET | Refills: 0 | Status: SHIPPED | OUTPATIENT
Start: 2023-05-03 | End: 2023-06-02

## 2023-05-03 RX ORDER — IRBESARTAN 150 MG/1
150 TABLET ORAL DAILY
Qty: 90 TABLET | Refills: 0 | Status: SHIPPED | OUTPATIENT
Start: 2023-05-03

## 2023-06-19 ENCOUNTER — OFFICE VISIT (OUTPATIENT)
Dept: FAMILY MEDICINE CLINIC | Facility: CLINIC | Age: 68
End: 2023-06-19
Payer: MEDICARE

## 2023-06-19 ENCOUNTER — TELEPHONE (OUTPATIENT)
Dept: LAB | Facility: HOSPITAL | Age: 68
End: 2023-06-19

## 2023-06-19 VITALS
WEIGHT: 139.6 LBS | RESPIRATION RATE: 18 BRPM | HEIGHT: 63 IN | DIASTOLIC BLOOD PRESSURE: 80 MMHG | SYSTOLIC BLOOD PRESSURE: 140 MMHG | BODY MASS INDEX: 24.73 KG/M2 | TEMPERATURE: 97.4 F | OXYGEN SATURATION: 98 % | HEART RATE: 84 BPM

## 2023-06-19 DIAGNOSIS — R01.1 MURMUR, CARDIAC: ICD-10-CM

## 2023-06-19 DIAGNOSIS — Q66.72 PES CAVUS OF BOTH FEET: ICD-10-CM

## 2023-06-19 DIAGNOSIS — B35.1 ONYCHOMYCOSIS: ICD-10-CM

## 2023-06-19 DIAGNOSIS — Q66.71 PES CAVUS OF BOTH FEET: ICD-10-CM

## 2023-06-19 DIAGNOSIS — R63.1 INCREASED THIRST: Primary | ICD-10-CM

## 2023-06-19 PROCEDURE — 99213 OFFICE O/P EST LOW 20 MIN: CPT | Performed by: CHIROPRACTOR

## 2023-06-19 NOTE — PROGRESS NOTES
Family Medicine Follow-Up Office Visit  Naty Chase 70 y o  female   MRN: [id number] : 1955  ENCOUNTER: 2023 9:41 AM    Assessment and Plan   Pes cavus of both feet  In the presence of chronic left ankle pain along with knee pain worse on the left, patient may very well benefit from foot orthoses with accommodating shoe  I recommend the patient consult with podiatry and be evaluated for foot orthoses, they can also assess her at that time for her comorbidities including onychomycosis  Onychomycosis  Patient has been using cough and a OTC creams and spray  Discussed risk versus benefits of p o  medication, will discuss with podiatry  Murmur, cardiac  Upon examination, patient has a grade 1/ 6 systolic murmur loudest at the apex  States she has also been experiencing increased lightheadedness  Upon discussion of the finding with the patient she states she believes she had a congenital murmur and has seen cardiology at some point in the past       Recommend cardiac update with most appropriate testing including echo, EKG and regular follow-up  Increased thirst   Patient states increased thirst however admits to likely drinking less water than she should  Patient states no known history of diabetes  Patient was counseled on adequate water hydration, referred for laboratory studies including HbA1c and advised to follow-up upon completion  Chief Complaint     Chief Complaint   Patient presents with   • Physical Exam     Knee and foot pain on left side       History of Present Illness   Naty Chase is a 70y o -year-old female who presents today for follow-up  The patient's chief complaints today are chronic left knee pain, left ankle pain with minimal swelling, petechial-like appearance on her legs and feet, lightheadedness and increased thirst   Patient states she is uncomfortable seeing physicians more frequently    Also states she still spends considerable time sitting at a "computer for hours, with little change in position or mobility  Patient last had an annual physical in August 2022 and has been scheduled for her Medicare annual wellness visit in August 2023  Review of Systems   Review of Systems   HENT: Positive for sinus pain  Cardiovascular: Negative for chest pain  Gastrointestinal: Negative for diarrhea, nausea and vomiting  Skin:        Scattered petechiae lower legs feet   Neurological: Positive for dizziness, light-headedness and headaches  Active Problem List     Patient Active Problem List   Diagnosis   • Essential hypertension   • Anxiety   • Gastroesophageal reflux disease without esophagitis   • Lightheadedness   • Left knee pain   • Episode of recurrent major depressive disorder (HCC)   • Onychomycosis   • Increased thirst   • Pes cavus of both feet   • Murmur, cardiac       Past Medical History, Past Surgical History, Family History, and Social History were reviewed and updated today as appropriate  Objective   /80 (BP Location: Right arm, Patient Position: Sitting, Cuff Size: Standard)   Pulse 84   Temp (!) 97 4 °F (36 3 °C) (Tympanic)   Resp 18   Ht 5' 3\" (1 6 m)   Wt 63 3 kg (139 lb 9 6 oz)   SpO2 98%   BMI 24 73 kg/m²     Physical Exam  Constitutional:       General: She is not in acute distress  Appearance: She is normal weight  She is not ill-appearing  HENT:      Head: Normocephalic  Eyes:      General: No scleral icterus  Extraocular Movements: Extraocular movements intact  Cardiovascular:      Rate and Rhythm: Normal rate and regular rhythm  Heart sounds: Murmur (I/VI loudest L [place]) heard  Pulmonary:      Effort: Pulmonary effort is normal       Breath sounds: Normal breath sounds  No wheezing, rhonchi or rales  Abdominal:      General: There is no distension  Musculoskeletal:      Right lower leg: No edema  Left lower leg: No edema  Skin:     General: Skin is warm        Comments: No raised " "skin lesions or signs of infection rather petechial appearance of the lower extremity part of which may be related to daily periods of prolonged sitting  Neurological:      General: No focal deficit present  Mental Status: She is alert           Pertinent Laboratory/Diagnostic Studies:  Lab Results   Component Value Date    BUN 14 06/02/2022    CREATININE 0 59 (L) 06/02/2022    CALCIUM 9 9 06/02/2022    K 3 5 06/02/2022    CO2 27 06/02/2022    CL 98 (L) 06/02/2022     Lab Results   Component Value Date    ALT 25 06/02/2022    AST 20 06/02/2022    ALKPHOS 74 06/02/2022       Lab Results   Component Value Date    WBC 7 29 06/08/2021    HGB 13 1 06/08/2021    HCT 40 1 06/08/2021    MCV 96 06/08/2021     06/08/2021       No results found for: \"TSH\"    No results found for: \"CHOL\"  No results found for: \"TRIG\"  No results found for: \"HDL\"  No results found for: \"LDLCALC\"  No results found for: \"HGBA1C\"    Results for orders placed or performed in visit on 05/19/22   Comprehensive metabolic panel   Result Value Ref Range    Sodium 134 (L) 136 - 145 mmol/L    Potassium 3 5 3 5 - 5 3 mmol/L    Chloride 98 (L) 100 - 108 mmol/L    CO2 27 21 - 32 mmol/L    ANION GAP 9 4 - 13 mmol/L    BUN 14 5 - 25 mg/dL    Creatinine 0 59 (L) 0 60 - 1 30 mg/dL    Glucose 73 65 - 140 mg/dL    Calcium 9 9 8 3 - 10 1 mg/dL    AST 20 5 - 45 U/L    ALT 25 12 - 78 U/L    Alkaline Phosphatase 74 46 - 116 U/L    Total Protein 7 8 6 4 - 8 2 g/dL    Albumin 4 2 3 5 - 5 0 g/dL    Total Bilirubin 0 35 0 20 - 1 00 mg/dL    eGFR 94 ml/min/1 73sq m       Orders Placed This Encounter   Procedures   • CBC and differential   • Comprehensive metabolic panel   • Hemoglobin A1C   • UA w Reflex to Microscopic w Reflex to Culture -Lab Collect   • Ambulatory Referral to Podiatry   • Ambulatory Referral to Cardiology         Current Medications     Current Outpatient Medications   Medication Sig Dispense Refill   • amLODIPine (NORVASC) 5 mg tablet Take 1 " tablet (5 mg total) by mouth daily 30 tablet 0   • irbesartan (AVAPRO) 150 mg tablet Take 1 tablet (150 mg total) by mouth daily 90 tablet 0   • pantoprazole (PROTONIX) 40 mg tablet Take 1 tablet (40 mg total) by mouth daily 90 tablet 0   • FLUoxetine (PROzac) 10 mg capsule Take 1 capsule (10 mg total) by mouth daily (Patient not taking: Reported on 6/19/2023) 30 capsule 0   • fluticasone (FLONASE) 50 mcg/act nasal spray Use 2 spray(s) in each nostril once daily (Patient not taking: Reported on 8/23/2022) 16 g 0     No current facility-administered medications for this visit         ALLERGIES:  Allergies   Allergen Reactions   • Azithromycin Hives and Itching   • Clindamycin Hives   • Penicillins Hives and Itching       Health Maintenance     Health Maintenance   Topic Date Due   • Colorectal Cancer Screening  Never done   • Osteoporosis Screening  Never done   • Breast Cancer Screening: Mammogram  10/25/2020   • COVID-19 Vaccine (3 - Pfizer series) 05/11/2021   • Pneumococcal Vaccine: 65+ Years (2 - PPSV23 if available, else PCV20) 09/15/2021   • Fall Risk  08/23/2023   • Medicare Annual Wellness Visit (AWV)  08/23/2023   • Influenza Vaccine (Season Ended) 09/01/2023   • Urinary Incontinence Screening  08/23/2023   • Depression Remission PHQ  12/19/2023   • BMI: Adult  06/19/2024   • Hepatitis C Screening  Completed   • HIB Vaccine  Aged Out   • IPV Vaccine  Aged Out   • Hepatitis A Vaccine  Aged Out   • Meningococcal ACWY Vaccine  Aged Out   • HPV Vaccine  Aged Out     Immunization History   Administered Date(s) Administered   • COVID-19 PFIZER VACCINE 0 3 ML IM 02/24/2021, 03/16/2021   • Influenza Split High Dose Preservative Free IM 09/01/2020   • Influenza, high dose seasonal 0 7 mL 10/19/2021   • Pneumococcal Conjugate 13-Valent 09/15/2020         Sol Ruiz MD   [address]  6/19/2023  9:41 AM    Parts of this note were dictated using [employer] dictation software and may have sounds-like errors due to variation in pronunciation

## 2023-06-19 NOTE — ASSESSMENT & PLAN NOTE
Patient has been using cough and a OTC creams and spray  Discussed risk versus benefits of p o  medication, will discuss with podiatry

## 2023-06-19 NOTE — ASSESSMENT & PLAN NOTE
Upon examination, patient has a grade 1/ 6 systolic murmur loudest at the apex  States she has also been experiencing increased lightheadedness  Upon discussion of the finding with the patient she states she believes she had a congenital murmur and has seen cardiology at some point in the past       Recommend cardiac update with most appropriate testing including echo, EKG and regular follow-up

## 2023-06-19 NOTE — ASSESSMENT & PLAN NOTE
In the presence of chronic left ankle pain along with knee pain worse on the left, patient may very well benefit from foot orthoses with accommodating shoe  I recommend the patient consult with podiatry and be evaluated for foot orthoses, they can also assess her at that time for her comorbidities including onychomycosis

## 2023-06-19 NOTE — ASSESSMENT & PLAN NOTE
Patient states increased thirst however admits to likely drinking less water than she should  Patient states no known history of diabetes  Patient was counseled on adequate water hydration, referred for laboratory studies including HbA1c and advised to follow-up upon completion

## 2023-06-20 DIAGNOSIS — I10 ESSENTIAL HYPERTENSION: ICD-10-CM

## 2023-06-20 DIAGNOSIS — K21.9 GASTROESOPHAGEAL REFLUX DISEASE WITHOUT ESOPHAGITIS: ICD-10-CM

## 2023-06-21 RX ORDER — IRBESARTAN 150 MG/1
TABLET ORAL
Qty: 90 TABLET | Refills: 1 | Status: SHIPPED | OUTPATIENT
Start: 2023-06-21

## 2023-06-21 RX ORDER — AMLODIPINE BESYLATE 5 MG/1
TABLET ORAL
Qty: 30 TABLET | Refills: 1 | Status: SHIPPED | OUTPATIENT
Start: 2023-06-21

## 2023-06-21 RX ORDER — PANTOPRAZOLE SODIUM 40 MG/1
TABLET, DELAYED RELEASE ORAL
Qty: 90 TABLET | Refills: 1 | Status: SHIPPED | OUTPATIENT
Start: 2023-06-21

## 2023-06-21 NOTE — TELEPHONE ENCOUNTER
I will refill, please call her and remind her to get the labs that were ordered for her on 6/19  Thanks!

## 2023-06-23 ENCOUNTER — APPOINTMENT (OUTPATIENT)
Dept: LAB | Facility: HOSPITAL | Age: 68
End: 2023-06-23
Payer: MEDICARE

## 2023-06-23 DIAGNOSIS — R63.1 INCREASED THIRST: ICD-10-CM

## 2023-06-23 DIAGNOSIS — E87.1 HYPONATREMIA: Primary | ICD-10-CM

## 2023-06-23 LAB
ALBUMIN SERPL BCP-MCNC: 4.3 G/DL (ref 3.5–5)
ALP SERPL-CCNC: 60 U/L (ref 34–104)
ALT SERPL W P-5'-P-CCNC: 13 U/L (ref 7–52)
ANION GAP SERPL CALCULATED.3IONS-SCNC: 8 MMOL/L
AST SERPL W P-5'-P-CCNC: 19 U/L (ref 13–39)
BASOPHILS # BLD AUTO: 0.04 THOUSANDS/ÂΜL (ref 0–0.1)
BASOPHILS NFR BLD AUTO: 1 % (ref 0–1)
BILIRUB SERPL-MCNC: 0.46 MG/DL (ref 0.2–1)
BUN SERPL-MCNC: 10 MG/DL (ref 5–25)
CALCIUM SERPL-MCNC: 9.8 MG/DL (ref 8.4–10.2)
CHLORIDE SERPL-SCNC: 95 MMOL/L (ref 96–108)
CO2 SERPL-SCNC: 28 MMOL/L (ref 21–32)
CREAT SERPL-MCNC: 0.6 MG/DL (ref 0.6–1.3)
EOSINOPHIL # BLD AUTO: 0.11 THOUSAND/ÂΜL (ref 0–0.61)
EOSINOPHIL NFR BLD AUTO: 2 % (ref 0–6)
ERYTHROCYTE [DISTWIDTH] IN BLOOD BY AUTOMATED COUNT: 13.5 % (ref 11.6–15.1)
EST. AVERAGE GLUCOSE BLD GHB EST-MCNC: 108 MG/DL
GFR SERPL CREATININE-BSD FRML MDRD: 93 ML/MIN/1.73SQ M
GLUCOSE P FAST SERPL-MCNC: 93 MG/DL (ref 65–99)
HBA1C MFR BLD: 5.4 %
HCT VFR BLD AUTO: 38.5 % (ref 34.8–46.1)
HGB BLD-MCNC: 12.2 G/DL (ref 11.5–15.4)
IMM GRANULOCYTES # BLD AUTO: 0.01 THOUSAND/UL (ref 0–0.2)
IMM GRANULOCYTES NFR BLD AUTO: 0 % (ref 0–2)
LYMPHOCYTES # BLD AUTO: 1.5 THOUSANDS/ÂΜL (ref 0.6–4.47)
LYMPHOCYTES NFR BLD AUTO: 26 % (ref 14–44)
MCH RBC QN AUTO: 30.1 PG (ref 26.8–34.3)
MCHC RBC AUTO-ENTMCNC: 31.7 G/DL (ref 31.4–37.4)
MCV RBC AUTO: 95 FL (ref 82–98)
MONOCYTES # BLD AUTO: 0.38 THOUSAND/ÂΜL (ref 0.17–1.22)
MONOCYTES NFR BLD AUTO: 7 % (ref 4–12)
NEUTROPHILS # BLD AUTO: 3.8 THOUSANDS/ÂΜL (ref 1.85–7.62)
NEUTS SEG NFR BLD AUTO: 64 % (ref 43–75)
NRBC BLD AUTO-RTO: 0 /100 WBCS
PLATELET # BLD AUTO: 359 THOUSANDS/UL (ref 149–390)
PMV BLD AUTO: 9.9 FL (ref 8.9–12.7)
POTASSIUM SERPL-SCNC: 3.7 MMOL/L (ref 3.5–5.3)
PROT SERPL-MCNC: 7.8 G/DL (ref 6.4–8.4)
RBC # BLD AUTO: 4.05 MILLION/UL (ref 3.81–5.12)
SODIUM SERPL-SCNC: 131 MMOL/L (ref 135–147)
WBC # BLD AUTO: 5.84 THOUSAND/UL (ref 4.31–10.16)

## 2023-06-23 PROCEDURE — 85025 COMPLETE CBC W/AUTO DIFF WBC: CPT

## 2023-06-23 PROCEDURE — 36415 COLL VENOUS BLD VENIPUNCTURE: CPT

## 2023-06-23 PROCEDURE — 80053 COMPREHEN METABOLIC PANEL: CPT

## 2023-06-23 PROCEDURE — 83036 HEMOGLOBIN GLYCOSYLATED A1C: CPT

## 2023-06-29 ENCOUNTER — TELEPHONE (OUTPATIENT)
Dept: FAMILY MEDICINE CLINIC | Facility: CLINIC | Age: 68
End: 2023-06-29

## 2023-07-12 ENCOUNTER — OFFICE VISIT (OUTPATIENT)
Dept: PODIATRY | Facility: CLINIC | Age: 68
End: 2023-07-12
Payer: MEDICARE

## 2023-07-12 VITALS
HEIGHT: 63 IN | OXYGEN SATURATION: 99 % | WEIGHT: 136 LBS | HEART RATE: 91 BPM | DIASTOLIC BLOOD PRESSURE: 76 MMHG | BODY MASS INDEX: 24.1 KG/M2 | SYSTOLIC BLOOD PRESSURE: 140 MMHG

## 2023-07-12 DIAGNOSIS — Q66.71 PES CAVUS OF BOTH FEET: ICD-10-CM

## 2023-07-12 DIAGNOSIS — M77.42 METATARSALGIA OF BOTH FEET: ICD-10-CM

## 2023-07-12 DIAGNOSIS — M25.562 CHRONIC PAIN OF LEFT KNEE: ICD-10-CM

## 2023-07-12 DIAGNOSIS — M77.41 METATARSALGIA OF BOTH FEET: ICD-10-CM

## 2023-07-12 DIAGNOSIS — G89.29 CHRONIC PAIN OF LEFT KNEE: ICD-10-CM

## 2023-07-12 DIAGNOSIS — B35.3 TINEA PEDIS OF BOTH FEET: Primary | ICD-10-CM

## 2023-07-12 DIAGNOSIS — Q66.72 PES CAVUS OF BOTH FEET: ICD-10-CM

## 2023-07-12 PROCEDURE — 99203 OFFICE O/P NEW LOW 30 MIN: CPT | Performed by: PODIATRIST

## 2023-07-12 RX ORDER — KETOCONAZOLE 20 MG/G
CREAM TOPICAL DAILY
Qty: 60 G | Refills: 3 | Status: SHIPPED | OUTPATIENT
Start: 2023-07-12

## 2023-07-12 NOTE — PROGRESS NOTES
Assessment/Plan:    The patient's clinical examination today significant for mild and diffuse tenderness with palpation of the dorsal midfoot joints. Some palpable osseous ridging is also noted along the midfoot joints consistent with DJD. There is no erythema nor edema nor calor nor ecchymosis noted to either foot. There is no significant tenderness palpation of the plantar fascia bilaterally. There is mild dry scaling skin to the plantar aspects of both feet but also spreads into the interdigital spaces. Mild pruritus is noted bilaterally. This is consistent with tinea pedis. Treatment options were discussed with the patient. We discussed the potential benefits of custom molded foot orthoses however given the potential cost the patient would like to try something over-the-counter first.  She has already purchased a pair of OTC arch supports and these were fitted and modified with metatarsal pads to assist with further offloading of the forefoot and support of the midfoot joints. She also notes left knee pain in the area of the patella. A referral was made for orthopedics today for further evaluation. She will continue with her OTC knee brace for now. I will also start her on ketoconazole 2% cream to be applied to the affected areas on her feet daily for the next 4 to 6 weeks. Diagnoses and all orders for this visit:    Tinea pedis of both feet  -     ketoconazole (NIZORAL) 2 % cream; Apply topically daily    Pes cavus of both feet  -     Ambulatory Referral to Podiatry    Chronic pain of left knee  -     Ambulatory Referral to Orthopedic Surgery; Future    Metatarsalgia of both feet          Subjective:      Patient ID: Luci Mays is a 70 y.o. female. The patient presents today for initial consultation with [facility] with a chief complaint of mild bilateral foot pain to the dorsal aspects of her feet that has been present now for several weeks.   She cannot recall any recent injury or trauma to either foot. She has purchased over-the-counter arch supports and feels that that have helped to some degree. She also notes some dry scaling skin to the plantar aspects of both feet with itching. She also notes left knee pain for which she is using an over-the-counter knee brace. She does note that prior x-rays of her left knee revealed arthritis, but she has not consulted with orthopedics. The following portions of the patient's history were reviewed and updated as appropriate: allergies, current medications, past family history, past medical history, past social history, past surgical history and problem list.    Review of Systems   Constitutional: Negative. HENT: Negative. Eyes: Negative. Respiratory: Negative. Cardiovascular: Negative. Endocrine: Negative. Musculoskeletal: Negative. Skin: Negative. Neurological: Negative. Hematological: Negative. Psychiatric/Behavioral: Negative. Objective:      /76 (BP Location: Left arm, Patient Position: Sitting, Cuff Size: Standard)   Pulse 91   Ht 5' 3" (1.6 m)   Wt 61.7 kg (136 lb)   SpO2 99%   BMI 24.09 kg/m²          Physical Exam  Constitutional:       Appearance: Normal appearance. HENT:      Head: Normocephalic and atraumatic. Nose: Nose normal.   Cardiovascular:      Pulses:           Dorsalis pedis pulses are 2+ on the right side and 2+ on the left side. Posterior tibial pulses are 2+ on the right side and 2+ on the left side. Pulmonary:      Effort: Pulmonary effort is normal.   Feet:      Right foot:      Skin integrity: Skin integrity normal.      Left foot:      Skin integrity: Skin integrity normal.      Comments: The patient's clinical examination today significant for mild and diffuse tenderness with palpation of the dorsal midfoot joints. Some palpable osseous ridging is also noted along the midfoot joints consistent with DJD.   There is no erythema nor edema nor calor nor ecchymosis noted to either foot. There is no significant tenderness palpation of the plantar fascia bilaterally. There is mild dry scaling skin to the plantar aspects of both feet but also spreads into the interdigital spaces. Mild pruritus is noted bilaterally. This is consistent with tinea pedis. Skin:     General: Skin is warm. Capillary Refill: Capillary refill takes less than 2 seconds. Neurological:      General: No focal deficit present. Mental Status: She is alert and oriented to person, place, and time. Psychiatric:         Mood and Affect: Mood normal.         Behavior: Behavior normal.         Thought Content:  Thought content normal.

## 2023-07-31 ENCOUNTER — TELEPHONE (OUTPATIENT)
Dept: FAMILY MEDICINE CLINIC | Facility: CLINIC | Age: 68
End: 2023-07-31

## 2023-07-31 NOTE — TELEPHONE ENCOUNTER
Encounter for forms      Patient requires a form to be completed.  Patient is aware of 7-10 day turn around time.    Please refer to the following information:       Type of Form: Prescriber response form     Date of Visit (if applicable):     Doctor: Sky     Expected date:     How patient would like to receive form: fax     Fax number: [phone]    Patient phone number: [phone]      Copy scanned to encounter. Copy provided to patient. Original in (X) team folder to be completed.

## 2023-11-07 ENCOUNTER — OFFICE VISIT (OUTPATIENT)
Dept: FAMILY MEDICINE CLINIC | Facility: CLINIC | Age: 68
End: 2023-11-07
Payer: MEDICARE

## 2023-11-07 VITALS
DIASTOLIC BLOOD PRESSURE: 80 MMHG | BODY MASS INDEX: 24.27 KG/M2 | TEMPERATURE: 98 F | SYSTOLIC BLOOD PRESSURE: 140 MMHG | HEART RATE: 88 BPM | OXYGEN SATURATION: 99 % | HEIGHT: 63 IN | RESPIRATION RATE: 18 BRPM | WEIGHT: 137 LBS

## 2023-11-07 DIAGNOSIS — Z00.00 ENCOUNTER FOR ANNUAL WELLNESS EXAM IN MEDICARE PATIENT: Primary | ICD-10-CM

## 2023-11-07 DIAGNOSIS — M25.562 CHRONIC PAIN OF LEFT KNEE: ICD-10-CM

## 2023-11-07 DIAGNOSIS — Z12.31 ENCOUNTER FOR SCREENING MAMMOGRAM FOR MALIGNANT NEOPLASM OF BREAST: ICD-10-CM

## 2023-11-07 DIAGNOSIS — F41.9 ANXIETY: ICD-10-CM

## 2023-11-07 DIAGNOSIS — F33.9 EPISODE OF RECURRENT MAJOR DEPRESSIVE DISORDER, UNSPECIFIED DEPRESSION EPISODE SEVERITY (HCC): ICD-10-CM

## 2023-11-07 DIAGNOSIS — Z12.11 ENCOUNTER FOR SCREENING COLONOSCOPY: ICD-10-CM

## 2023-11-07 DIAGNOSIS — Z13.220 SCREENING, LIPID: ICD-10-CM

## 2023-11-07 DIAGNOSIS — R30.0 DYSURIA: ICD-10-CM

## 2023-11-07 DIAGNOSIS — G89.29 CHRONIC PAIN OF LEFT KNEE: ICD-10-CM

## 2023-11-07 DIAGNOSIS — E87.1 HYPONATREMIA: ICD-10-CM

## 2023-11-07 DIAGNOSIS — Z74.8 ASSISTANCE NEEDED WITH TRANSPORTATION: ICD-10-CM

## 2023-11-07 DIAGNOSIS — N39.498 OTHER URINARY INCONTINENCE: ICD-10-CM

## 2023-11-07 PROBLEM — R32 ABSENCE OF BLADDER CONTINENCE: Status: ACTIVE | Noted: 2023-11-07

## 2023-11-07 LAB
SL AMB  POCT GLUCOSE, UA: ABNORMAL
SL AMB LEUKOCYTE ESTERASE,UA: ABNORMAL
SL AMB POCT BILIRUBIN,UA: ABNORMAL
SL AMB POCT BLOOD,UA: ABNORMAL
SL AMB POCT CLARITY,UA: CLEAR
SL AMB POCT COLOR,UA: YELLOW
SL AMB POCT KETONES,UA: ABNORMAL
SL AMB POCT NITRITE,UA: ABNORMAL
SL AMB POCT PH,UA: 7.5
SL AMB POCT SPECIFIC GRAVITY,UA: 1.01
SL AMB POCT URINE PROTEIN: ABNORMAL
SL AMB POCT UROBILINOGEN: 0.2

## 2023-11-07 PROCEDURE — 81002 URINALYSIS NONAUTO W/O SCOPE: CPT

## 2023-11-07 PROCEDURE — G0439 PPPS, SUBSEQ VISIT: HCPCS

## 2023-11-07 RX ORDER — FLUOXETINE 10 MG/1
10 CAPSULE ORAL DAILY
Qty: 30 CAPSULE | Refills: 0 | Status: SHIPPED | OUTPATIENT
Start: 2023-11-07

## 2023-11-07 NOTE — ASSESSMENT & PLAN NOTE
-She notes some urinary leakage commonly present in the morning, occasionally at other times of the day. -She notes often having the urge to urinate and not being able to. Denies leakage with coughing or straining. Plan  -Refer to urology for further evaluation.

## 2023-11-07 NOTE — PATIENT INSTRUCTIONS
Take melatonin as needed to aid in sleep. You can pick this up over the counter at any pharmacy. Start at the lowest dose of 3 mg, you may take 6 mg nightly if this does not work.

## 2023-11-07 NOTE — ASSESSMENT & PLAN NOTE
-Chronic, 2/2 fall 2021. Symptoms largely unchanged    Plan  -She is unsure if she would like to pursue additional workup or intervention at this time. She was given a PT referral if she decides she would like to pursue treatment.

## 2023-11-07 NOTE — ASSESSMENT & PLAN NOTE
69 y/o F in need of multiple screenings here for AWV. She is resistant to get screenings, stating that she does not want to find something bad. Today she was amenable to placing orders for colonoscopy and mammogram, although she is unsure if she will schedule them.

## 2023-11-07 NOTE — ASSESSMENT & PLAN NOTE
-Previously took Prozac but stopped quickly due to mild GI upset. She notes that it did help her anxiety.   -She states that she previously took Buspar but that it made her dizzy. -She notes feeling anxious on a daily basis. Plan  -Re-trial prozac. She was counseled that she may experience some GI upset initially but that usually subsides.   -Follow up 3 weeks, will try different agent if prozac is not tolerated well.

## 2023-11-07 NOTE — PROGRESS NOTES
Assessment and Plan:     Problem List Items Addressed This Visit          Other    Anxiety     -Previously took Prozac but stopped quickly due to mild GI upset. She notes that it did help her anxiety.   -She states that she previously took Buspar but that it made her dizzy. -She notes feeling anxious on a daily basis. Plan  -Re-trial prozac. She was counseled that she may experience some GI upset initially but that usually subsides.   -Follow up 3 weeks, will try different agent if prozac is not tolerated well. Relevant Medications    FLUoxetine (PROzac) 10 mg capsule    Left knee pain     -Chronic, 2/2 fall 2021. Symptoms largely unchanged    Plan  -She is unsure if she would like to pursue additional workup or intervention at this time. She was given a PT referral if she decides she would like to pursue treatment. Relevant Orders    Ambulatory referral to Physical Therapy    Episode of recurrent major depressive disorder (HCC)    Relevant Medications    FLUoxetine (PROzac) 10 mg capsule    Encounter for annual wellness exam in Medicare patient - Primary     71 y/o F in need of multiple screenings here for AWV. She is resistant to get screenings, stating that she does not want to find something bad. Today she was amenable to placing orders for colonoscopy and mammogram, although she is unsure if she will schedule them. Dysuria     Urine dip unremarkable. Will refer to urology for evaluation of incontinence. Relevant Orders    POCT urine dip (Completed)    Urinary incontinence     -She notes some urinary leakage commonly present in the morning, occasionally at other times of the day. -She notes often having the urge to urinate and not being able to. Denies leakage with coughing or straining. Plan  -Refer to urology for further evaluation.          Relevant Orders    Ambulatory Referral to Urology     Other Visit Diagnoses       Assistance needed with transportation Relevant Orders    Ambulatory Referral to Complex Care Management Program    Screening, lipid        Relevant Orders    Lipid panel    Encounter for screening mammogram for malignant neoplasm of breast        Relevant Orders    Mammo screening bilateral w 3d & cad    Encounter for screening colonoscopy        Relevant Orders    Ambulatory Referral to Gastroenterology    Hyponatremia        Relevant Orders    Comprehensive metabolic panel             Preventive health issues were discussed with patient, and age appropriate screening tests were ordered as noted in patient's After Visit Summary. Personalized health advice and appropriate referrals for health education or preventive services given if needed, as noted in patient's After Visit Summary. History of Present Illness:     Patient presents for a Medicare Wellness Visit    71 y/o F presents for her AWV. Today she notes chronic L knee pain and some L foot pain. She also notes some urinary urgency and occasional incontinence in the morning when she first wakes up, as well as occasional burning and foul smelling urine. The notes that these urinary symptoms have been occurring for several months. Patient Care Team:  Farrah Haq DO as PCP - General (Family Medicine)     Review of Systems:     Review of Systems   Constitutional:  Negative for activity change and fatigue. Respiratory:  Negative for chest tightness and shortness of breath. Cardiovascular:  Negative for chest pain. Gastrointestinal:  Negative for abdominal pain and nausea. Genitourinary:  Positive for difficulty urinating and dysuria (occasional). Urinary incontinence   Musculoskeletal:  Positive for arthralgias (L knee, L foot). Neurological:  Negative for weakness and light-headedness. Psychiatric/Behavioral:  The patient is nervous/anxious.          Problem List:     Patient Active Problem List   Diagnosis    Essential hypertension    Anxiety    Gastroesophageal reflux disease without esophagitis    Lightheadedness    Left knee pain    Episode of recurrent major depressive disorder ([address])    Encounter for annual wellness exam in Medicare patient    Onychomycosis    Increased thirst    Pes cavus of both feet    Murmur, cardiac    Dysuria    Urinary incontinence      Past Medical and Surgical History:     Past Medical History:   Diagnosis Date    Anxiety     Arthritis     left knee    Depression     Hypertension     Migraines     Rhinitis     Rosacea      Past Surgical History:   Procedure Laterality Date    BREAST LUMPECTOMY Left     pathology benign    CYST REMOVAL  2019    Excision of left axillary sebaceous cyst 2cm      Family History:     Family History   Problem Relation Age of Onset    Stroke Mother     Thrombocytopenia Mother     Other Father         neoplasm of brain     Brain cancer Father     Hypertension Father     Lung cancer Sister     Lung cancer Sister     Lupus Sister     Cerebral aneurysm Sister       Social History:     Social History     Socioeconomic History    Marital status:      Spouse name: None    Number of children: None    Years of education: None    Highest education level: None   Occupational History    Occupation: retired   Tobacco Use    Smoking status: Former     Packs/day: 1.00     Years: 20.00     Total pack years: 20.00     Types: Cigarettes     Quit date:      Years since quittin.8    Smokeless tobacco: Never   Vaping Use    Vaping Use: Never used   Substance and Sexual Activity    Alcohol use: Not Currently    Drug use: Never    Sexual activity: None   Other Topics Concern    None   Social History Narrative    declined for colonoscopy/FOBT    Most recent tobacco use screenin2019      Advance directive:   Yes, living will     As per [place]      Social Determinants of Health     Financial Resource Strain: Low Risk  (2023)    Overall Financial Resource Strain (CARDIA)     Difficulty of Paying Living Expenses: Not very hard   Food Insecurity: Not on file   Transportation Needs: No Transportation Needs (11/7/2023)    PRAPARE - Transportation     Lack of Transportation (Medical): No     Lack of Transportation (Non-Medical): No   Physical Activity: Not on file   Stress: Not on file   Social Connections: Not on file   Intimate Partner Violence: Not on file   Housing Stability: Not on file      Medications and Allergies:     Current Outpatient Medications   Medication Sig Dispense Refill    amLODIPine (NORVASC) 5 mg tablet Take 1 tablet by mouth once daily 30 tablet 0    FLUoxetine (PROzac) 10 mg capsule Take 1 capsule (10 mg total) by mouth daily 30 capsule 0    irbesartan (AVAPRO) 150 mg tablet Take 1 tablet by mouth once daily 90 tablet 1    ketoconazole (NIZORAL) 2 % cream Apply topically daily 60 g 3    pantoprazole (PROTONIX) 40 mg tablet Take 1 tablet by mouth once daily 90 tablet 1    fluticasone (FLONASE) 50 mcg/act nasal spray Use 2 spray(s) in each nostril once daily (Patient not taking: Reported on 8/23/2022) 16 g 0     No current facility-administered medications for this visit.      Allergies   Allergen Reactions    Azithromycin Hives and Itching    Clindamycin Hives    Penicillins Hives and Itching      Immunizations:     Immunization History   Administered Date(s) Administered    COVID-19 PFIZER VACCINE 0.3 ML IM 02/24/2021, 03/16/2021    Influenza Split High Dose Preservative Free IM 09/01/2020    Influenza, high dose seasonal 0.7 mL 10/19/2021    Pneumococcal Conjugate 13-Valent 09/15/2020      Health Maintenance:         Topic Date Due    Colorectal Cancer Screening  Never done    Breast Cancer Screening: Mammogram  10/25/2020    Hepatitis C Screening  Completed         Topic Date Due    COVID-19 Vaccine (3 - Pfizer series) 05/11/2021    Pneumococcal Vaccine: 65+ Years (2 - PPSV23 if available, else PCV20) 09/15/2021    Influenza Vaccine (1) 09/01/2023      Medicare Screening Tests and Risk Assessments:     Radha Pang is here for her Initial Wellness visit. Health Risk Assessment:   Patient rates overall health as good. Patient feels that their physical health rating is same. Patient is satisfied with their life. Eyesight was rated as slightly worse. Hearing was rated as same. Patient feels that their emotional and mental health rating is same. Patients states they are sometimes angry. Patient states they are often unusually tired/fatigued. Pain experienced in the last 7 days has been some. Patient's pain rating has been 5/10. Patient states that she has experienced no weight loss or gain in last 6 months. Fall Risk Screening: In the past year, patient has experienced: no history of falling in past year      Urinary Incontinence Screening:   Patient has not leaked urine accidently in the last six months. Home Safety:  Patient has trouble with stairs inside or outside of their home. Patient has working smoke alarms and has working carbon monoxide detector. Home safety hazards include: none. Nutrition:   Current diet is Regular and Limited junk food. Medications:   Patient is not currently taking any over-the-counter supplements. Patient is able to manage medications. Activities of Daily Living (ADLs)/Instrumental Activities of Daily Living (IADLs):   Walk and transfer into and out of bed and chair?: Yes  Dress and groom yourself?: Yes    Bathe or shower yourself?: Yes    Feed yourself?  Yes  Do your laundry/housekeeping?: Yes  Manage your money, pay your bills and track your expenses?: Yes  Make your own meals?: Yes    Do your own shopping?: Yes    Previous Hospitalizations:   Any hospitalizations or ED visits within the last 12 months?: No      PREVENTIVE SCREENINGS        Diabetes Screening:     General: Screening Current      Cervical Cancer Screening:    General: Screening Not Indicated      Lung Cancer Screening:     General: Screening Not Indicated      Hepatitis C Screening: General: Screening Current    No results found. Physical Exam:     /80 (BP Location: Left arm, Patient Position: Sitting, Cuff Size: Standard)   Pulse 88   Temp 98 °F (36.7 °C) (Tympanic)   Resp 18   Ht 5' 3" (1.6 m)   Wt 62.1 kg (137 lb)   SpO2 99%   BMI 24.27 kg/m²     Physical Exam  Constitutional:       General: She is not in acute distress. Appearance: She is not toxic-appearing. HENT:      Head: Normocephalic and atraumatic. Eyes:      Extraocular Movements: Extraocular movements intact. Conjunctiva/sclera: Conjunctivae normal.   Cardiovascular:      Heart sounds: Normal heart sounds. Pulmonary:      Effort: Pulmonary effort is normal.      Breath sounds: Normal breath sounds. Abdominal:      Palpations: Abdomen is soft. Tenderness: There is no abdominal tenderness. Skin:     General: Skin is warm and dry. Neurological:      Mental Status: She is alert and oriented to person, place, and time.    Psychiatric:         Judgment: Judgment normal.      Comments: anxious          Jimmie Crowell, DO

## 2023-11-08 ENCOUNTER — PATIENT OUTREACH (OUTPATIENT)
Dept: FAMILY MEDICINE CLINIC | Facility: CLINIC | Age: 68
End: 2023-11-08

## 2023-11-08 NOTE — PROGRESS NOTES
PCP referral for CCM was received via IB. Chart reviewed. Patient was seen at her PCP office yesterday for [address] AWV. Patient with c/o anxiety on a daily basis. Was on Buspar in the past which caused dizziness and stopped. Was also on Prozac for a limited time due to stomach upset. Patient was prescribed Prozac again to trial and see if it could be tolerated and help. This RNCM attempted to call patient at both contact numbers. There was no answer at either and a detailed message left with a request for a call back. RNCM to follow.

## 2023-11-12 DIAGNOSIS — I10 ESSENTIAL HYPERTENSION: ICD-10-CM

## 2023-11-13 ENCOUNTER — PATIENT OUTREACH (OUTPATIENT)
Dept: FAMILY MEDICINE CLINIC | Facility: CLINIC | Age: 68
End: 2023-11-13

## 2023-11-13 RX ORDER — AMLODIPINE BESYLATE 5 MG/1
TABLET ORAL
Qty: 30 TABLET | Refills: 0 | Status: SHIPPED | OUTPATIENT
Start: 2023-11-13

## 2023-11-13 NOTE — LETTER
Date: 11/13/23  Dear Zaira Germain,   My name is Destiny Irvin; I am a registered nurse care manager working with Dr Edwin watt. I have not been able to reach you and would like to set a time that I can talk with you over the phone. My work is to help patients that have complex medical conditions get the care they need. This includes patients who may have been in the hospital or emergency room. Please call me with any questions you may have. I look forward to speaking with you. Sincerely,  Heidy Law  [phone]  Outpatient Care Manager  Copy:   [address]  [address] [facility] [address] [phone].

## 2023-11-13 NOTE — PROGRESS NOTES
IB message received from Dr Ani May, patients PCP. He states referral is for transportation insecurity. Chart was reviewed and this RNCM called patient. There was no answer and a detailed message was left with a request for a call back. Outreach #2 and an UTR letter sent via My Chart.     This RNCM removed self from care team.

## 2023-11-19 ENCOUNTER — TELEPHONE (OUTPATIENT)
Dept: OTHER | Facility: OTHER | Age: 68
End: 2023-11-19

## 2023-11-19 NOTE — TELEPHONE ENCOUNTER
Patient is calling regarding cancelling an appointment.     Date/Time: 11/20/2023    Patient was rescheduled: YES [] NO [x]    Patient requesting call back to reschedule: YES [] NO [x]

## 2023-12-15 ENCOUNTER — TELEPHONE (OUTPATIENT)
Dept: LAB | Facility: HOSPITAL | Age: 68
End: 2023-12-15

## 2023-12-18 DIAGNOSIS — I10 ESSENTIAL HYPERTENSION: ICD-10-CM

## 2023-12-18 RX ORDER — AMLODIPINE BESYLATE 5 MG/1
TABLET ORAL
Qty: 30 TABLET | Refills: 0 | Status: SHIPPED | OUTPATIENT
Start: 2023-12-18

## 2023-12-19 DIAGNOSIS — B35.3 TINEA PEDIS OF BOTH FEET: ICD-10-CM

## 2023-12-26 RX ORDER — KETOCONAZOLE 20 MG/G
CREAM TOPICAL DAILY
Qty: 60 G | Refills: 0 | Status: SHIPPED | OUTPATIENT
Start: 2023-12-26

## 2024-01-03 ENCOUNTER — OFFICE VISIT (OUTPATIENT)
Dept: FAMILY MEDICINE CLINIC | Facility: CLINIC | Age: 69
End: 2024-01-03
Payer: MEDICARE

## 2024-01-03 DIAGNOSIS — B35.3 TINEA PEDIS OF BOTH FEET: ICD-10-CM

## 2024-01-03 DIAGNOSIS — Z11.59 ENCOUNTER FOR SCREENING FOR OTHER VIRAL DISEASES: Primary | ICD-10-CM

## 2024-01-03 LAB
SARS-COV-2 AG UPPER RESP QL IA: NEGATIVE
VALID CONTROL: NORMAL

## 2024-01-03 PROCEDURE — 87811 SARS-COV-2 COVID19 W/OPTIC: CPT

## 2024-01-03 PROCEDURE — 99213 OFFICE O/P EST LOW 20 MIN: CPT

## 2024-01-03 NOTE — ASSESSMENT & PLAN NOTE
Patient's tinea pedis flare up not being improved with using her prescription 2% ketoconazole cream twice a day for the past 5 days. Ordered her terbinafine cream to use twice a day for 2 weeks to see if that works better. Told patient to make sure she puts the cream in between her toes and in all the crevices of her foot to ensure it is covering everywhere.

## 2024-01-03 NOTE — ASSESSMENT & PLAN NOTE
Patient most likely has a viral etiology for the cause of her symptoms. Tested negative for Covid in the office today. Most likely has to run its course and patient can continue with supportive therapy like her nasal saline spray and honey. Told patient that she can call the office in a week if she is not improving and also make an appointment for follow up. Patient chose to wait it out and see how she feels before officially scheduling a follow up.

## 2024-01-03 NOTE — PROGRESS NOTES
Name: Chel Barber      : 1955      MRN: [id number]  Encounter Provider: Yasmin Prajapati DO  Encounter Date: 1/3/2024   Encounter department: [facility]    Assessment & Plan     1. Encounter for screening for other viral diseases  Assessment & Plan:  Patient most likely has a viral etiology for the cause of her symptoms. Tested negative for Covid in the office today. Most likely has to run its course and patient can continue with supportive therapy like her nasal saline spray and honey. Told patient that she can call the office in a week if she is not improving and also make an appointment for follow up. Patient chose to wait it out and see how she feels before officially scheduling a follow up.     Orders:  -     POCT Rapid Covid Ag    2. Tinea pedis of both feet  Assessment & Plan:  Patient's tinea pedis flare up not being improved with using her prescription 2% ketoconazole cream twice a day for the past 5 days. Ordered her terbinafine cream to use twice a day for 2 weeks to see if that works better. Told patient to make sure she puts the cream in between her toes and in all the crevices of her foot to ensure it is covering everywhere.              Subjective      Patient is a 68 year old female presenting to the office due to burning and itching of her feet, L>R. She has history of tinea pedis b/l and sees a podiatrist who prescribes her 2% ketoconazole cream. She has been using it twice a day since Friday but it has not been helping. She says this does feel like her usual tinea pedis but this is worse than it has been.     She is also complaining of 1 week of symptoms of feeling sick including weakness, fatigue, decreased appetite, cough, hot and cold flashes/sweats, and congestion in her sinuses and ears. She has only been eating chicken soup and crackers this week. Her cough is productive of clear phlegm. Denies chest pain, SOB, wheezing, fever.          Review of Systems    Constitutional:  Positive for activity change, appetite change, chills and fatigue. Negative for diaphoresis and fever.   HENT:  Positive for congestion, ear pain and sinus pressure. Negative for postnasal drip, rhinorrhea and sore throat.    Eyes:  Negative for visual disturbance.   Respiratory:  Positive for cough. Negative for shortness of breath and wheezing.    Cardiovascular:  Negative for chest pain.   Gastrointestinal:  Positive for nausea. Negative for abdominal pain, constipation, diarrhea and vomiting.   Genitourinary:  Negative for difficulty urinating.   Skin:  Positive for rash.        Itchy feet b/l   Neurological:  Positive for dizziness, light-headedness and headaches.       Current Outpatient Medications on File Prior to Visit   Medication Sig    amLODIPine (NORVASC) 5 mg tablet Take 1 tablet by mouth once daily    FLUoxetine (PROzac) 10 mg capsule Take 1 capsule (10 mg total) by mouth daily (Patient not taking: Reported on 1/3/2024)    fluticasone (FLONASE) 50 mcg/act nasal spray Use 2 spray(s) in each nostril once daily (Patient not taking: Reported on 8/23/2022)    irbesartan (AVAPRO) 150 mg tablet Take 1 tablet by mouth once daily    ketoconazole (NIZORAL) 2 % cream Apply topically daily    pantoprazole (PROTONIX) 40 mg tablet Take 1 tablet by mouth once daily    terbinafine (LamISIL) 1 % cream Apply topically 2 (two) times a day for 14 days       Objective     There were no vitals taken for this visit. Yes there were... weight 136 lbs 12.8 oz, BMI 24.23, /80, pulse 94, RR 18, height 5 foot 3 in, spo2 100%.    Physical Exam  Constitutional:       General: She is not in acute distress.     Appearance: Normal appearance.   HENT:      Head: Normocephalic.      Right Ear: Tympanic membrane, ear canal and external ear normal.      Left Ear: Tympanic membrane, ear canal and external ear normal.      Nose: Nose normal.      Mouth/Throat:      Mouth: Mucous membranes are moist.      Pharynx:  Oropharynx is clear. Posterior oropharyngeal erythema present.   Cardiovascular:      Rate and Rhythm: Normal rate and regular rhythm.      Heart sounds: Normal heart sounds.   Pulmonary:      Effort: Pulmonary effort is normal. No respiratory distress.      Breath sounds: Normal breath sounds. No wheezing, rhonchi or rales.   Abdominal:      General: Abdomen is flat. Bowel sounds are normal.      Palpations: Abdomen is soft.      Tenderness: There is no abdominal tenderness.   Musculoskeletal:      Right lower leg: No edema.      Left lower leg: No edema.   Skin:     General: Skin is warm and dry.      Findings: Rash present.      Comments: B/l rash on feet left>right   Neurological:      General: No focal deficit present.      Mental Status: She is alert and oriented to person, place, and time.   Psychiatric:         Mood and Affect: Mood normal.         Behavior: Behavior normal.       Yasmin Prajapati DO

## 2024-01-05 ENCOUNTER — TELEPHONE (OUTPATIENT)
Dept: UROLOGY | Facility: CLINIC | Age: 69
End: 2024-01-05

## 2024-01-06 PROBLEM — Z00.00 ENCOUNTER FOR ANNUAL WELLNESS EXAM IN MEDICARE PATIENT: Status: RESOLVED | Noted: 2022-05-09 | Resolved: 2024-01-06

## 2024-01-22 DIAGNOSIS — I10 ESSENTIAL HYPERTENSION: ICD-10-CM

## 2024-01-22 DIAGNOSIS — K21.9 GASTROESOPHAGEAL REFLUX DISEASE WITHOUT ESOPHAGITIS: ICD-10-CM

## 2024-01-22 RX ORDER — IRBESARTAN 150 MG/1
TABLET ORAL
Qty: 90 TABLET | Refills: 0 | Status: SHIPPED | OUTPATIENT
Start: 2024-01-22

## 2024-01-22 RX ORDER — PANTOPRAZOLE SODIUM 40 MG/1
TABLET, DELAYED RELEASE ORAL
Qty: 90 TABLET | Refills: 0 | Status: SHIPPED | OUTPATIENT
Start: 2024-01-22

## 2024-01-22 RX ORDER — AMLODIPINE BESYLATE 5 MG/1
TABLET ORAL
Qty: 30 TABLET | Refills: 0 | Status: SHIPPED | OUTPATIENT
Start: 2024-01-22

## 2024-02-21 ENCOUNTER — TELEPHONE (OUTPATIENT)
Age: 69
End: 2024-02-21

## 2024-02-21 PROBLEM — Z11.59 ENCOUNTER FOR SCREENING FOR OTHER VIRAL DISEASES: Status: RESOLVED | Noted: 2024-01-03 | Resolved: 2024-02-21

## 2024-02-21 NOTE — TELEPHONE ENCOUNTER
02/21/24  Screened by: Danette Lee    Referring Provider     Pre- Screening:     There is no height or weight on file to calculate BMI.  24.23  Has patient been referred for a routine screening Colonoscopy? yes  Is the patient between 45-75 years old? no      Previous Colonoscopy no   If yes:    Date:     Facility:     Reason:     Does the patient want to see a Gastroenterologist prior to their procedure OR are they having any GI symptoms? yes    Has the patient been hospitalized or had abdominal surgery in the past 6 months? no    Does the patient use supplemental oxygen? no    Does the patient take Coumadin, Lovenox, Plavix, Elliquis, Xarelto, or other blood thinning medication? no    Has the patient had a stroke, cardiac event, or stent placed in the past year? no    If patient is between 45yrs - 49yrs, please advise patient that we will have to confirm benefits & coverage with their insurance company for a routine screening colonoscopy.

## 2024-02-21 NOTE — TELEPHONE ENCOUNTER
Patient scheduled a colonoscopy consult on 4/2/24 with LISSET Mcrae. Patient is experiencing abdominal discomfort and loose stools.    GERD (gastroesophageal reflux disease)

## 2024-02-28 DIAGNOSIS — I10 ESSENTIAL HYPERTENSION: ICD-10-CM

## 2024-02-28 RX ORDER — AMLODIPINE BESYLATE 5 MG/1
5 TABLET ORAL DAILY
Qty: 30 TABLET | Refills: 0 | Status: SHIPPED | OUTPATIENT
Start: 2024-02-28

## 2024-03-11 ENCOUNTER — OFFICE VISIT (OUTPATIENT)
Dept: FAMILY MEDICINE CLINIC | Facility: CLINIC | Age: 69
End: 2024-03-11
Payer: MEDICARE

## 2024-03-11 VITALS
BODY MASS INDEX: 23.6 KG/M2 | OXYGEN SATURATION: 100 % | TEMPERATURE: 98.3 F | DIASTOLIC BLOOD PRESSURE: 80 MMHG | HEIGHT: 63 IN | HEART RATE: 83 BPM | SYSTOLIC BLOOD PRESSURE: 130 MMHG | WEIGHT: 133.2 LBS

## 2024-03-11 DIAGNOSIS — R19.5 DARK STOOLS: ICD-10-CM

## 2024-03-11 DIAGNOSIS — R68.89 COLD INTOLERANCE: ICD-10-CM

## 2024-03-11 DIAGNOSIS — R68.89 HEAT INTOLERANCE: ICD-10-CM

## 2024-03-11 DIAGNOSIS — R19.8 ALTERNATING CONSTIPATION AND DIARRHEA: Primary | ICD-10-CM

## 2024-03-11 DIAGNOSIS — R10.9 ABDOMINAL CRAMPING: ICD-10-CM

## 2024-03-11 PROCEDURE — G2211 COMPLEX E/M VISIT ADD ON: HCPCS

## 2024-03-11 PROCEDURE — 99213 OFFICE O/P EST LOW 20 MIN: CPT

## 2024-03-11 RX ORDER — DICYCLOMINE HYDROCHLORIDE 10 MG/1
10 CAPSULE ORAL 3 TIMES DAILY PRN
Qty: 28 CAPSULE | Refills: 0 | Status: SHIPPED | OUTPATIENT
Start: 2024-03-11 | End: 2024-03-11 | Stop reason: SDUPTHER

## 2024-03-11 RX ORDER — DICYCLOMINE HYDROCHLORIDE 10 MG/1
10 CAPSULE ORAL 3 TIMES DAILY PRN
Qty: 28 CAPSULE | Refills: 0 | Status: SHIPPED | OUTPATIENT
Start: 2024-03-11 | End: 2024-03-18

## 2024-03-11 NOTE — ASSESSMENT & PLAN NOTE
Etiology unclear.  Present with alternating heat intolerance.  Etiology unclear at this time.  Will check CBC and TSH follow-up in 3 months, or sooner if indicated by lab results or if symptoms do not improve.

## 2024-03-11 NOTE — PROGRESS NOTES
Name: Chel Barber      : 1955      MRN: [id number]  Encounter Provider: Toi Swanson DO  Encounter Date: 3/11/2024   Encounter department: [facility]    Assessment & Plan     1. Alternating constipation and diarrhea  Assessment & Plan:  She notes alternating constipation and diarrhea, worse over the past 4 to 6 weeks.  Also associated with feeling somewhat fatigued as well as generalized aches and pains.  No clear inciting event, recent travel, or exposure to anyone with similar symptoms.  Favor IBS with mixed constipation and diarrhea at this time.  Consider IBD or infectious etiology less likely at this time.  Cannot exclude malignancy, however patient has an appointment with GI on  to discuss colonoscopy.      Plan  Keep GI appointment   Check TSH, CBC  Encouraged to do track and avoid which foods cause her symptoms  Encouraged to ensure proper hydration  Discussed adequate fiber intake, specifically foods that are high in fiber such as prunes that do not upset her stomach  May trial Bentyl as needed on days when she experiences cramping  May continue OTC simethicone for gas  May use MiraLAX as needed instances where she goes more than 1 day without a bowel movement        Orders:  -     TSH, 3rd generation with Free T4 reflex; Future    2. Dark stools  Assessment & Plan:  Present occasionally with constipation.  She notes taking Pepto-Bismol before and, this is likely the source behind the dark stools.  She denies seeing any blood in the stool.    Plan  Check CBC, follow-up at previously scheduled GI appointment next month.    Orders:  -     CBC and differential; Future    3. Heat intolerance  Assessment & Plan:  Etiology unclear.  Present with alternating cold intolerance.  Will check CBC and TSH, follow-up 3 months or sooner if symptoms do not improve or lab results indicate.    Orders:  -     CBC and differential; Future  -     TSH, 3rd generation with Free T4 reflex;  Future    4. Cold intolerance  Assessment & Plan:  Etiology unclear.  Present with alternating heat intolerance.  Etiology unclear at this time.  Will check CBC and TSH follow-up in 3 months, or sooner if indicated by lab results or if symptoms do not improve.    Orders:  -     CBC and differential; Future  -     TSH, 3rd generation with Free T4 reflex; Future    5. Abdominal cramping  Assessment & Plan:  She notes increasing abdominal cramping over the past 1 month on days when she experiences loose stools.      Plan  Trial Bentyl as needed for cramping.  Keep GI appointment next month.    Orders:  -     dicyclomine (BENTYL) 10 mg capsule; Take 1 capsule (10 mg total) by mouth 3 (three) times a day as needed (cramping) for up to 7 days           Subjective      Patient with PMH of fibromyalgia, HTN, depression, and anxiety is here today with one month of continual bloating, flatulence, belching, and constipation, diarrhea. Patient reports that these symptoms have come and go- sometimes she has diarrhea other times constipation. She reports that after she finally goes the bathroom the bloating is relieved. Her stools tend to be dark and hard when she is constipated. Denies blood in the stool. Patient has tried eliminating certain foods like onions, broccoli and garlic which has not helped. Patient is notably lactose intolerant but has avoided dairy products completely. Denies vomiting, and fever. Patient reports that she runs hot and cold throughout the day, when she sweats and then hours later has the chills. She also notes constant aches throughout her entire body and feels sluggish with no energy. She adds that her mood has been very down because she is unable to go out due to these symptoms. Patient repots taking amlodipine 5mg, irbesartan, and pantoprazole. Patient was on senakot, dulcolax in the past for constipation which caused her cramps.     GI Problem  The primary symptoms include fatigue, abdominal pain  (cramping), nausea, diarrhea, myalgias and arthralgias. Primary symptoms do not include fever, vomiting or dysuria.   The illness is also significant for chills and constipation.   Generalized Body Aches  Associated symptoms include fatigue, abdominal pain (cramping), constipation, diarrhea and nausea. Pertinent negatives include no ear discharge, ear pain, headaches, rhinorrhea, stridor, fever, chest pain, shortness of breath, wheezing, vomiting or joint swelling.   Nausea  Associated symptoms include abdominal pain (cramping), arthralgias, chills, fatigue, myalgias and nausea. Pertinent negatives include no chest pain, diaphoresis, fever, headaches, joint swelling or vomiting.   Diarrhea   Associated symptoms include abdominal pain (cramping), arthralgias, chills and myalgias. Pertinent negatives include no fever, headaches or vomiting.   Constipation  Associated symptoms include abdominal pain (cramping), diarrhea and nausea. Pertinent negatives include no difficulty urinating, fever, rectal pain or vomiting.     Review of Systems   Constitutional:  Positive for chills and fatigue. Negative for diaphoresis and fever.   HENT:  Negative for ear discharge, ear pain, rhinorrhea, sinus pressure and sinus pain.    Eyes: Negative.    Respiratory:  Negative for choking, chest tightness, shortness of breath, wheezing and stridor.    Cardiovascular:  Negative for chest pain, palpitations and leg swelling.   Gastrointestinal:  Positive for abdominal pain (cramping), constipation, diarrhea and nausea. Negative for abdominal distention, anal bleeding, blood in stool, rectal pain and vomiting.   Endocrine: Positive for cold intolerance and heat intolerance.   Genitourinary:  Negative for difficulty urinating, dysuria, vaginal bleeding and vaginal discharge.   Musculoskeletal:  Positive for arthralgias and myalgias. Negative for joint swelling.   Skin: Negative.    Neurological:  Negative for syncope, light-headedness and  "headaches.   Psychiatric/Behavioral:  Positive for decreased concentration, dysphoric mood and sleep disturbance. Negative for agitation, behavioral problems, confusion and hallucinations. The patient is nervous/anxious. The patient is not hyperactive.        Current Outpatient Medications on File Prior to Visit   Medication Sig    amLODIPine (NORVASC) 5 mg tablet Take 1 tablet (5 mg total) by mouth daily    irbesartan (AVAPRO) 150 mg tablet Take 1 tablet by mouth once daily    ketoconazole (NIZORAL) 2 % cream Apply topically daily    pantoprazole (PROTONIX) 40 mg tablet Take 1 tablet by mouth once daily    FLUoxetine (PROzac) 10 mg capsule Take 1 capsule (10 mg total) by mouth daily (Patient not taking: Reported on 1/3/2024)    fluticasone (FLONASE) 50 mcg/act nasal spray Use 2 spray(s) in each nostril once daily (Patient not taking: Reported on 8/23/2022)    terbinafine (LamISIL) 1 % cream Apply topically 2 (two) times a day for 14 days       Objective     /80 (BP Location: Right arm, Patient Position: Sitting, Cuff Size: Standard)   Pulse 83   Temp 98.3 °F (36.8 °C) (Tympanic)   Ht 5' 3\" (1.6 m)   Wt 60.4 kg (133 lb 3.2 oz)   SpO2 100%   BMI 23.60 kg/m²     Physical Exam  Constitutional:       General: She is not in acute distress.     Appearance: She is not toxic-appearing.   HENT:      Head: Normocephalic and atraumatic.      Mouth/Throat:      Mouth: Mucous membranes are moist.   Eyes:      Conjunctiva/sclera: Conjunctivae normal.   Cardiovascular:      Rate and Rhythm: Normal rate and regular rhythm.      Heart sounds: Normal heart sounds.   Pulmonary:      Effort: Pulmonary effort is normal.      Breath sounds: Normal breath sounds.   Abdominal:      Palpations: Abdomen is soft.      Tenderness: There is no guarding.   Musculoskeletal:      Cervical back: Normal range of motion.   Skin:     General: Skin is warm and dry.   Neurological:      Mental Status: She is alert and oriented to person, " place, and time.      Gait: Gait abnormal (ambulates with cane).   Psychiatric:         Judgment: Judgment normal.       Toi Swanson,

## 2024-03-11 NOTE — PATIENT INSTRUCTIONS
Please take bentyl only as needed if you are having cramping. Please be sure to drink plenty of water throughout the day. Try to incorporate fiber rich foods such as prunes and other fruits and vegetables that you can tolerate into your diet.

## 2024-03-11 NOTE — ASSESSMENT & PLAN NOTE
She notes increasing abdominal cramping over the past 1 month on days when she experiences loose stools.      Plan  Trial Bentyl as needed for cramping.  Keep GI appointment next month.

## 2024-03-11 NOTE — ASSESSMENT & PLAN NOTE
Present occasionally with constipation.  She notes taking Pepto-Bismol before and, this is likely the source behind the dark stools.  She denies seeing any blood in the stool.    Plan  Check CBC, follow-up at previously scheduled GI appointment next month.

## 2024-03-11 NOTE — ASSESSMENT & PLAN NOTE
Etiology unclear.  Present with alternating cold intolerance.  Will check CBC and TSH, follow-up 3 months or sooner if symptoms do not improve or lab results indicate.

## 2024-03-11 NOTE — ASSESSMENT & PLAN NOTE
She notes alternating constipation and diarrhea, worse over the past 4 to 6 weeks.  Also associated with feeling somewhat fatigued as well as generalized aches and pains.  No clear inciting event, recent travel, or exposure to anyone with similar symptoms.  Favor IBS with mixed constipation and diarrhea at this time.  Consider IBD or infectious etiology less likely at this time.  Cannot exclude malignancy, however patient has an appointment with GI on 4/2 to discuss colonoscopy.      Plan  Keep GI appointment 4/2  Check TSH, CBC  Encouraged to do track and avoid which foods cause her symptoms  Encouraged to ensure proper hydration  Discussed adequate fiber intake, specifically foods that are high in fiber such as prunes that do not upset her stomach  May trial Bentyl as needed on days when she experiences cramping  May continue OTC simethicone for gas  May use MiraLAX as needed instances where she goes more than 1 day without a bowel movement

## 2024-03-13 ENCOUNTER — TELEPHONE (OUTPATIENT)
Dept: LAB | Facility: HOSPITAL | Age: 69
End: 2024-03-13

## 2024-03-18 ENCOUNTER — TELEPHONE (OUTPATIENT)
Dept: LAB | Facility: HOSPITAL | Age: 69
End: 2024-03-18

## 2024-03-25 DIAGNOSIS — I10 ESSENTIAL HYPERTENSION: ICD-10-CM

## 2024-03-25 RX ORDER — AMLODIPINE BESYLATE 5 MG/1
5 TABLET ORAL DAILY
Qty: 30 TABLET | Refills: 0 | Status: SHIPPED | OUTPATIENT
Start: 2024-03-25

## 2024-04-01 ENCOUNTER — APPOINTMENT (OUTPATIENT)
Dept: LAB | Facility: HOSPITAL | Age: 69
End: 2024-04-01
Payer: MEDICARE

## 2024-04-01 DIAGNOSIS — R68.89 COLD INTOLERANCE: ICD-10-CM

## 2024-04-01 DIAGNOSIS — Z13.220 SCREENING, LIPID: ICD-10-CM

## 2024-04-01 DIAGNOSIS — R68.89 HEAT INTOLERANCE: ICD-10-CM

## 2024-04-01 DIAGNOSIS — E87.1 HYPONATREMIA: ICD-10-CM

## 2024-04-01 DIAGNOSIS — R19.8 ALTERNATING CONSTIPATION AND DIARRHEA: ICD-10-CM

## 2024-04-01 DIAGNOSIS — R19.5 DARK STOOLS: ICD-10-CM

## 2024-04-01 LAB
ALBUMIN SERPL BCP-MCNC: 4.6 G/DL (ref 3.5–5)
ALP SERPL-CCNC: 61 U/L (ref 34–104)
ALT SERPL W P-5'-P-CCNC: 21 U/L (ref 7–52)
ANION GAP SERPL CALCULATED.3IONS-SCNC: 13 MMOL/L (ref 4–13)
AST SERPL W P-5'-P-CCNC: 24 U/L (ref 13–39)
BASOPHILS # BLD AUTO: 0.04 THOUSANDS/ÂΜL (ref 0–0.1)
BASOPHILS NFR BLD AUTO: 1 % (ref 0–1)
BILIRUB SERPL-MCNC: 0.52 MG/DL (ref 0.2–1)
BUN SERPL-MCNC: 18 MG/DL (ref 5–25)
CALCIUM SERPL-MCNC: 10.4 MG/DL (ref 8.4–10.2)
CHLORIDE SERPL-SCNC: 98 MMOL/L (ref 96–108)
CHOLEST SERPL-MCNC: 201 MG/DL
CO2 SERPL-SCNC: 29 MMOL/L (ref 21–32)
CREAT SERPL-MCNC: 0.71 MG/DL (ref 0.6–1.3)
EOSINOPHIL # BLD AUTO: 0.07 THOUSAND/ÂΜL (ref 0–0.61)
EOSINOPHIL NFR BLD AUTO: 1 % (ref 0–6)
ERYTHROCYTE [DISTWIDTH] IN BLOOD BY AUTOMATED COUNT: 14.6 % (ref 11.6–15.1)
GFR SERPL CREATININE-BSD FRML MDRD: 86 ML/MIN/1.73SQ M
GLUCOSE P FAST SERPL-MCNC: 100 MG/DL (ref 65–99)
HCT VFR BLD AUTO: 41.1 % (ref 34.8–46.1)
HDLC SERPL-MCNC: 90 MG/DL
HGB BLD-MCNC: 13 G/DL (ref 11.5–15.4)
IMM GRANULOCYTES # BLD AUTO: 0.02 THOUSAND/UL (ref 0–0.2)
IMM GRANULOCYTES NFR BLD AUTO: 0 % (ref 0–2)
LDLC SERPL CALC-MCNC: 88 MG/DL (ref 0–100)
LYMPHOCYTES # BLD AUTO: 1.4 THOUSANDS/ÂΜL (ref 0.6–4.47)
LYMPHOCYTES NFR BLD AUTO: 20 % (ref 14–44)
MCH RBC QN AUTO: 30.1 PG (ref 26.8–34.3)
MCHC RBC AUTO-ENTMCNC: 31.6 G/DL (ref 31.4–37.4)
MCV RBC AUTO: 95 FL (ref 82–98)
MONOCYTES # BLD AUTO: 0.38 THOUSAND/ÂΜL (ref 0.17–1.22)
MONOCYTES NFR BLD AUTO: 6 % (ref 4–12)
NEUTROPHILS # BLD AUTO: 5.06 THOUSANDS/ÂΜL (ref 1.85–7.62)
NEUTS SEG NFR BLD AUTO: 72 % (ref 43–75)
NONHDLC SERPL-MCNC: 111 MG/DL
NRBC BLD AUTO-RTO: 0 /100 WBCS
PLATELET # BLD AUTO: 351 THOUSANDS/UL (ref 149–390)
PMV BLD AUTO: 10.4 FL (ref 8.9–12.7)
POTASSIUM SERPL-SCNC: 3.9 MMOL/L (ref 3.5–5.3)
PROT SERPL-MCNC: 7.7 G/DL (ref 6.4–8.4)
RBC # BLD AUTO: 4.32 MILLION/UL (ref 3.81–5.12)
SODIUM SERPL-SCNC: 140 MMOL/L (ref 135–147)
TRIGL SERPL-MCNC: 115 MG/DL
TSH SERPL DL<=0.05 MIU/L-ACNC: 2.85 UIU/ML (ref 0.45–4.5)
WBC # BLD AUTO: 6.97 THOUSAND/UL (ref 4.31–10.16)

## 2024-04-01 PROCEDURE — 36415 COLL VENOUS BLD VENIPUNCTURE: CPT

## 2024-04-01 PROCEDURE — 80061 LIPID PANEL: CPT

## 2024-04-01 PROCEDURE — 85025 COMPLETE CBC W/AUTO DIFF WBC: CPT

## 2024-04-01 PROCEDURE — 80053 COMPREHEN METABOLIC PANEL: CPT

## 2024-04-01 PROCEDURE — 84443 ASSAY THYROID STIM HORMONE: CPT

## 2024-04-22 DIAGNOSIS — I10 ESSENTIAL HYPERTENSION: ICD-10-CM

## 2024-04-22 DIAGNOSIS — K21.9 GASTROESOPHAGEAL REFLUX DISEASE WITHOUT ESOPHAGITIS: ICD-10-CM

## 2024-04-23 DIAGNOSIS — K21.9 GASTROESOPHAGEAL REFLUX DISEASE WITHOUT ESOPHAGITIS: ICD-10-CM

## 2024-04-23 DIAGNOSIS — I10 ESSENTIAL HYPERTENSION: ICD-10-CM

## 2024-04-23 RX ORDER — PANTOPRAZOLE SODIUM 40 MG/1
TABLET, DELAYED RELEASE ORAL
Qty: 90 TABLET | Refills: 1 | Status: SHIPPED | OUTPATIENT
Start: 2024-04-23

## 2024-04-23 RX ORDER — AMLODIPINE BESYLATE 5 MG/1
5 TABLET ORAL DAILY
Qty: 30 TABLET | Refills: 5 | Status: SHIPPED | OUTPATIENT
Start: 2024-04-23

## 2024-04-23 RX ORDER — IRBESARTAN 150 MG/1
TABLET ORAL
Qty: 90 TABLET | Refills: 1 | Status: SHIPPED | OUTPATIENT
Start: 2024-04-23

## 2024-04-24 RX ORDER — IRBESARTAN 150 MG/1
150 TABLET ORAL DAILY
Qty: 90 TABLET | Refills: 0 | OUTPATIENT
Start: 2024-04-24

## 2024-04-24 RX ORDER — AMLODIPINE BESYLATE 5 MG/1
5 TABLET ORAL DAILY
Qty: 30 TABLET | Refills: 0 | OUTPATIENT
Start: 2024-04-24

## 2024-04-24 RX ORDER — PANTOPRAZOLE SODIUM 40 MG/1
40 TABLET, DELAYED RELEASE ORAL DAILY
Qty: 90 TABLET | Refills: 0 | OUTPATIENT
Start: 2024-04-24

## 2024-06-06 ENCOUNTER — RA CDI HCC (OUTPATIENT)
Dept: OTHER | Facility: HOSPITAL | Age: 69
End: 2024-06-06

## 2024-09-05 ENCOUNTER — TELEPHONE (OUTPATIENT)
Dept: FAMILY MEDICINE CLINIC | Facility: CLINIC | Age: 69
End: 2024-09-05

## 2024-09-05 NOTE — TELEPHONE ENCOUNTER
Left a voicemail on patients phone to call and schedule an appointment with the office due to not being seen in year or longer      Thank you   Zoila GRADY

## 2024-10-11 DIAGNOSIS — I10 ESSENTIAL HYPERTENSION: ICD-10-CM

## 2024-10-11 RX ORDER — AMLODIPINE BESYLATE 5 MG/1
5 TABLET ORAL DAILY
Qty: 30 TABLET | Refills: 0 | Status: SHIPPED | OUTPATIENT
Start: 2024-10-11

## 2024-10-15 DIAGNOSIS — I10 ESSENTIAL HYPERTENSION: ICD-10-CM

## 2024-10-15 DIAGNOSIS — K21.9 GASTROESOPHAGEAL REFLUX DISEASE WITHOUT ESOPHAGITIS: ICD-10-CM

## 2024-10-16 RX ORDER — PANTOPRAZOLE SODIUM 40 MG/1
TABLET, DELAYED RELEASE ORAL
Qty: 30 TABLET | Refills: 0 | Status: SHIPPED | OUTPATIENT
Start: 2024-10-16

## 2024-10-16 RX ORDER — IRBESARTAN 150 MG/1
TABLET ORAL
Qty: 30 TABLET | Refills: 0 | Status: SHIPPED | OUTPATIENT
Start: 2024-10-16

## 2024-10-29 ENCOUNTER — OFFICE VISIT (OUTPATIENT)
Dept: FAMILY MEDICINE CLINIC | Facility: CLINIC | Age: 69
End: 2024-10-29
Payer: MEDICARE

## 2024-10-29 VITALS
SYSTOLIC BLOOD PRESSURE: 149 MMHG | BODY MASS INDEX: 21.79 KG/M2 | HEIGHT: 63 IN | DIASTOLIC BLOOD PRESSURE: 83 MMHG | OXYGEN SATURATION: 99 % | HEART RATE: 89 BPM | TEMPERATURE: 97.8 F | WEIGHT: 123 LBS

## 2024-10-29 DIAGNOSIS — R21 RASH: Primary | ICD-10-CM

## 2024-10-29 DIAGNOSIS — F41.9 ANXIETY AND DEPRESSION: ICD-10-CM

## 2024-10-29 DIAGNOSIS — F32.A ANXIETY AND DEPRESSION: ICD-10-CM

## 2024-10-29 PROCEDURE — G2211 COMPLEX E/M VISIT ADD ON: HCPCS

## 2024-10-29 PROCEDURE — 99213 OFFICE O/P EST LOW 20 MIN: CPT

## 2024-10-29 NOTE — PROGRESS NOTES
Ambulatory Visit  Name: Chel Barber      : 1955      MRN: [id number]  Encounter Provider: Annie Gonzalez MD  Encounter Date: 10/29/2024   Encounter department: [facility]    Assessment & Plan  Rash  Non-pruritic, non-painful, non-vesicular,  slight erythematous with orange tint rash on bilateral ankles since Friday  Low suspicion for shingles or infectious rash  Dry skin patch on mid back likely to be 2/2 constant alcohol appliance  Advised patient to avoid surgical scrub, alcohol appliance, antimicrobial    Avoid alcohol or other surgical scrubs that may be irritating/ drying out the skin  Continue antifungal on feet and on rash at ankle; avoid moisturizing while trying to clear Athlete's feet  Recommended to stop using antibiotic cream on top of antifungal       Anxiety and depression  PHQ-9 score of 12 and PIPPA 7 score of 15.  Significant and noticeable anxiety in regards to germs  Discussed medications options, however patient declined at this time  Continue to evaluate and monitor on upcoming appointments  Depression Screening Follow-up Plan: Patient's depression screening was positive with a PHQ-9 score of 12. Patient declines further evaluation by mental health professional and/or medications. They have no active suicidal ideations. Brief counseling provided and recommend additional follow-up/re-evaluation at next office visit.            History of Present Illness       HPI  69 year old female presents for evaluation of a rash. She first noticed the rash a couple days ago, she denies any itchiness or painfulness. She has left foot athletes foot and she thinks that the rash on her left ankle may bee an extension of this. She has been using antibiotic ointement in addition to her antifungal for her tinea pedis. She notes that she started the antibiotic ointment a couple days ago shortly after the rash emerged.     She complains of an additional skin lesion on the  midline of her upper back. On her upper back she has been placing multiple topical antibacterials, including alcohol and a surgical scrub. She notes that her skin seems to be more irritated and dry after using the multiple disinfecting agents.     Today she had a positive PHQ-9 and PIPPA-7 and notes consistent worrying from the moment she wakes up. She denies any interest in pursuing treatment, stating that she has tried multiple other SSRIs and could not tolerate the side effects.  Patient was visibly anxious and uncomfortable in regards to germs.  She expressed discomfort after introducing myself by shaking her hand.  She continued to express throughout the encounter how I touched her with the same gloves I used to examine her feet.  I continued to redirect and reassure patient that I changed gloves and cleaned my hands.  No SI, HI.          History obtained from : patient  Review of Systems   Constitutional:  Negative for fever.   HENT:  Negative for congestion and sore throat.    Eyes: Negative.    Respiratory: Negative.     Cardiovascular:  Positive for leg swelling. Negative for chest pain and palpitations.   Endocrine: Negative.    Genitourinary: Negative.    Musculoskeletal: Negative.    Skin:  Positive for rash. Negative for wound.   Allergic/Immunologic: Negative for food allergies.   Neurological: Negative.    Hematological: Negative.    Psychiatric/Behavioral: Negative.       Current Outpatient Medications on File Prior to Visit   Medication Sig Dispense Refill    amLODIPine (NORVASC) 5 mg tablet Take 1 tablet by mouth once daily 30 tablet 0    irbesartan (AVAPRO) 150 mg tablet Take 1 tablet by mouth once daily 30 tablet 0    pantoprazole (PROTONIX) 40 mg tablet Take 1 tablet by mouth once daily 30 tablet 0    terbinafine (LamISIL) 1 % cream Apply topically 2 (two) times a day for 14 days 15 g 1    dicyclomine (BENTYL) 10 mg capsule Take 1 capsule (10 mg total) by mouth 3 (three) times a day as needed  (cramping) for up to 7 days (Patient not taking: Reported on 10/29/2024) 28 capsule 0    FLUoxetine (PROzac) 10 mg capsule Take 1 capsule (10 mg total) by mouth daily (Patient not taking: Reported on 1/3/2024) 30 capsule 0    fluticasone (FLONASE) 50 mcg/act nasal spray Use 2 spray(s) in each nostril once daily (Patient not taking: Reported on 8/23/2022) 16 g 0    ketoconazole (NIZORAL) 2 % cream Apply topically daily (Patient not taking: Reported on 10/29/2024) 60 g 0     No current facility-administered medications on file prior to visit.          Objective     Wt 55.8 kg (123 lb)   BMI 21.79 kg/m²     Physical Exam  Constitutional:       General: She is not in acute distress.     Appearance: Normal appearance. She is normal weight. She is not ill-appearing, toxic-appearing or diaphoretic.   HENT:      Head: Atraumatic.   Pulmonary:      Effort: Pulmonary effort is normal.   Musculoskeletal:         General: No swelling or tenderness.      Cervical back: Normal range of motion and neck supple.      Right lower leg: No edema.      Left lower leg: No edema.   Skin:     General: Skin is warm.      Capillary Refill: Capillary refill takes less than 2 seconds.      Findings: Rash present. No bruising. Rash is not crusting, macular, papular, purpuric, pustular, scaling, urticarial or vesicular.      Comments: Non-painful, slight erythematous rash around ankles bilaterally    Dry skin patch on mid thoracic area; non-tender   Neurological:      Mental Status: She is alert.   Psychiatric:         Mood and Affect: Mood is anxious.         Behavior: Behavior is cooperative.

## 2024-10-29 NOTE — PROGRESS NOTES
Ambulatory Visit  Name: Chel Barber      : 1955      MRN: [id number]  Encounter Provider: Annie Gonzalez MD  Encounter Date: 10/29/2024   Encounter department: [facility]    Assessment & Plan  Rash  Non-pruritic, non-painful, non-vesicular,  slight erythematous with orange tint rash on bilateral ankles since Friday  Low suspicion for shingles or infectious rash  Dry skin patch on mid back likely to be 2/2 constant alcohol appliance  Advised patient to avoid surgical scrub, alcohol appliance, antimicrobial    Avoid alcohol or other surgical scrubs that may be irritating/ drying out the skin  Continue antifungal on feet and on rash at ankle; avoid moisturizing while trying to clear Athlete's feet  Recommended to stop using antibiotic cream on top of antifungal       Anxiety and depression  PHQ-9 score of 12 and PIPPA 7 score of 15.  Significant and noticeable anxiety in regards to germs  Discussed medications options, however patient declined at this time  Continue to evaluate and monitor on upcoming appointments  Depression Screening Follow-up Plan: Patient's depression screening was positive with a PHQ-9 score of 12. Patient declines further evaluation by mental health professional and/or medications. They have no active suicidal ideations. Brief counseling provided and recommend additional follow-up/re-evaluation at next office visit.           Depression Screening and Follow-up Plan: Patient's depression screening was positive with a PHQ-9 score of 12. Patient declines further evaluation by mental health professional and/or medications. Brief counseling provided. Will re-evaluate at next office visit.       History of Present Illness       HPI  69 year old female presents for evaluation of a rash. She first noticed the rash a couple days ago, she denies any itchiness or painfulness. She has left foot athletes foot and she thinks that the rash on her left ankle may bee an  extension of this. She has been using antibiotic ointement in addition to her antifungal for her tinea pedis. She notes that she started the antibiotic ointment a couple days ago shortly after the rash emerged.     She complains of an additional skin lesion on the midline of her upper back. On her upper back she has been placing multiple topical antibacterials, including alcohol and a surgical scrub. She notes that her skin seems to be more irritated and dry after using the multiple disinfecting agents.     Today she had a positive PHQ-9 and PIPPA-7 and notes consistent worrying from the moment she wakes up. She denies any interest in pursuing treatment, stating that she has tried multiple other SSRIs and could not tolerate the side effects.  Patient was visibly anxious and uncomfortable in regards to germs.  She expressed discomfort after introducing myself by shaking her hand.  She continued to express throughout the encounter how I touched her with the same gloves I used to examine her feet.  I continued to redirect and reassure patient that I changed gloves and cleaned my hands.  No SI, HI.          History obtained from : patient  Review of Systems   Constitutional:  Negative for fever.   HENT:  Negative for congestion and sore throat.    Eyes: Negative.    Respiratory: Negative.     Cardiovascular:  Positive for leg swelling. Negative for chest pain and palpitations.   Endocrine: Negative.    Genitourinary: Negative.    Musculoskeletal: Negative.    Skin:  Positive for rash. Negative for wound.   Allergic/Immunologic: Negative for food allergies.   Neurological: Negative.    Hematological: Negative.    Psychiatric/Behavioral: Negative.       Current Outpatient Medications on File Prior to Visit   Medication Sig Dispense Refill    amLODIPine (NORVASC) 5 mg tablet Take 1 tablet by mouth once daily 30 tablet 0    irbesartan (AVAPRO) 150 mg tablet Take 1 tablet by mouth once daily 30 tablet 0    pantoprazole  "(PROTONIX) 40 mg tablet Take 1 tablet by mouth once daily 30 tablet 0    terbinafine (LamISIL) 1 % cream Apply topically 2 (two) times a day for 14 days 15 g 1    dicyclomine (BENTYL) 10 mg capsule Take 1 capsule (10 mg total) by mouth 3 (three) times a day as needed (cramping) for up to 7 days (Patient not taking: Reported on 10/29/2024) 28 capsule 0    FLUoxetine (PROzac) 10 mg capsule Take 1 capsule (10 mg total) by mouth daily (Patient not taking: Reported on 1/3/2024) 30 capsule 0    fluticasone (FLONASE) 50 mcg/act nasal spray Use 2 spray(s) in each nostril once daily (Patient not taking: Reported on 8/23/2022) 16 g 0    ketoconazole (NIZORAL) 2 % cream Apply topically daily (Patient not taking: Reported on 10/29/2024) 60 g 0     No current facility-administered medications on file prior to visit.          Objective     /83 (BP Location: Right arm, Patient Position: Sitting, Cuff Size: Standard)   Pulse 89   Temp 97.8 °F (36.6 °C) (Temporal)   Ht 5' 3\" (1.6 m)   Wt 55.8 kg (123 lb)   SpO2 99%   BMI 21.79 kg/m²     Physical Exam  Constitutional:       General: She is not in acute distress.     Appearance: Normal appearance. She is normal weight. She is not ill-appearing, toxic-appearing or diaphoretic.   HENT:      Head: Atraumatic.   Pulmonary:      Effort: Pulmonary effort is normal.   Musculoskeletal:         General: No swelling or tenderness.      Cervical back: Normal range of motion and neck supple.      Right lower leg: No edema.      Left lower leg: No edema.   Skin:     General: Skin is warm.      Capillary Refill: Capillary refill takes less than 2 seconds.      Findings: Rash present. No bruising. Rash is not crusting, macular, papular, purpuric, pustular, scaling, urticarial or vesicular.      Comments: Non-painful, slight erythematous rash around ankles bilaterally    Dry skin patch on mid thoracic area; non-tender   Neurological:      Mental Status: She is alert.   Psychiatric:        "  Mood and Affect: Mood is anxious.         Behavior: Behavior is cooperative.

## 2024-11-08 ENCOUNTER — OFFICE VISIT (OUTPATIENT)
Dept: FAMILY MEDICINE CLINIC | Facility: CLINIC | Age: 69
End: 2024-11-08

## 2024-11-08 ENCOUNTER — TELEPHONE (OUTPATIENT)
Dept: LAB | Facility: HOSPITAL | Age: 69
End: 2024-11-08

## 2024-11-08 VITALS
TEMPERATURE: 97.6 F | OXYGEN SATURATION: 98 % | SYSTOLIC BLOOD PRESSURE: 162 MMHG | BODY MASS INDEX: 21.83 KG/M2 | WEIGHT: 123.2 LBS | DIASTOLIC BLOOD PRESSURE: 82 MMHG | HEIGHT: 63 IN | HEART RATE: 83 BPM

## 2024-11-08 DIAGNOSIS — R32 URINARY INCONTINENCE, UNSPECIFIED TYPE: ICD-10-CM

## 2024-11-08 DIAGNOSIS — R53.83 OTHER FATIGUE: ICD-10-CM

## 2024-11-08 DIAGNOSIS — F41.9 ANXIETY: ICD-10-CM

## 2024-11-08 DIAGNOSIS — Z00.00 ENCOUNTER FOR ANNUAL WELLNESS EXAM IN MEDICARE PATIENT: Primary | ICD-10-CM

## 2024-11-08 DIAGNOSIS — I10 ESSENTIAL HYPERTENSION: ICD-10-CM

## 2024-11-08 DIAGNOSIS — E44.1 MILD PROTEIN-CALORIE MALNUTRITION (HCC): ICD-10-CM

## 2024-11-08 DIAGNOSIS — R60.0 LOWER EXTREMITY EDEMA: ICD-10-CM

## 2024-11-08 DIAGNOSIS — L60.3 BRITTLE NAILS: ICD-10-CM

## 2024-11-08 DIAGNOSIS — F32.A DEPRESSION, UNSPECIFIED DEPRESSION TYPE: ICD-10-CM

## 2024-11-08 RX ORDER — AMLODIPINE BESYLATE 5 MG/1
5 TABLET ORAL DAILY
Qty: 30 TABLET | Refills: 5 | Status: SHIPPED | OUTPATIENT
Start: 2024-11-08

## 2024-11-08 RX ORDER — HYDROCHLOROTHIAZIDE 12.5 MG/1
12.5 TABLET ORAL DAILY
Qty: 60 TABLET | Refills: 3 | Status: SHIPPED | OUTPATIENT
Start: 2024-11-08

## 2024-11-08 NOTE — PROGRESS NOTES
Ambulatory Visit  Name: Chel Barber      : 1955      MRN: [id number]  Encounter Provider: Toi Swanson DO  Encounter Date: 2024   Encounter department: [facility]      Assessment & Plan  Encounter for annual wellness exam in Medicare patient  Colonoscopy, mammogram, DEXA scan all recommended but declined by patient at this time.       Essential hypertension  177/91 on arrival, 162/82 on recheck. Worsened significantly since last appointment.  Patient does endorse eating a poor diet recently, especially junk foods that are high in salt.  She notes that she has been compliant with regimen of amlodipine 5 and irbesartan 150 daily.  Denies any symptoms.  She does endorse being more anxious recently, partially due to anxiety over increased lower extremity edema.    Plan  Continue current doses of amlodipine and irbesartan  Trial HCTZ 12.5 daily  Follow-up in 3 weeks for recheck  Orders:    Comprehensive metabolic panel; Future    hydroCHLOROthiazide 12.5 mg tablet; Take 1 tablet (12.5 mg total) by mouth daily    Anxiety  She notes increased anxiety recently, partially related to health anxiety.  She has tried SSRIs as well as BuSpar in the past, which were poorly tolerated due to side effects.  Denies any SI.  Does not wish to trial additional medication at this time.  Does not wish to pursue therapy at this time.  Will continue to monitor at follow-up appointment in 3 weeks.  Orders:    TSH, 3rd generation with Free T4 reflex; Future    CBC and differential; Future    Iron Panel (Includes Ferritin, Iron Sat%, Iron, and TIBC); Future    Lower extremity edema  1+ lower extremity swelling, she feels like it may have been present for weeks but not sure.  No other accompanying symptoms.  She does endorse eating a significant amount of salty foods recently.  She has some concern this may be due to amlodipine, however she has been on amlodipine for over a year and does not endorse  "previous issues.  Etiology unclear at this time, consider possibly due to dietary indiscretion but cannot exclude venous disease or heart failure.  Medication side effect less likely but not excluded.      Plan  Add HCTZ 12.5 daily  Echocardiogram ordered  Follow-up in 3 weeks for further evaluation    Orders:    Comprehensive metabolic panel; Future    Echo complete w/ contrast if indicated; Future    hydroCHLOROthiazide 12.5 mg tablet; Take 1 tablet (12.5 mg total) by mouth daily    Urinary incontinence, unspecified type  She notes small amounts of \"dribbling\" if she does not go to the bathroom immediately in the morning, however describes symptoms is relatively mild and less severe than 1 year ago.  She had previously been referred to urology when she describes symptoms as more severe, however did not ultimately wish to establish.  She has not tried any pelvic floor strengthening exercises.    Plan  Given handout on pelvic floor strengthening exercises and recommended to perform several times daily  Follow-up in 3 weeks       Brittle nails  Worsening recently.  Etiology unclear, although she does endorse poor diet.  Orders:    TSH, 3rd generation with Free T4 reflex; Future    CBC and differential; Future    Iron Panel (Includes Ferritin, Iron Sat%, Iron, and TIBC); Future    Depression, unspecified depression type  Denies SI.  Does not wish to trial medication at this time.  Does not wish to engage in therapy at this time.  Will follow-up in 3 weeks.    Orders:    CBC and differential; Future    Iron Panel (Includes Ferritin, Iron Sat%, Iron, and TIBC); Future    Other fatigue  Chronic, potentially slightly worse recently.  Orders:    CBC and differential; Future    Iron Panel (Includes Ferritin, Iron Sat%, Iron, and TIBC); Future    Mild protein-calorie malnutrition (HCC)  Poor diet endorsed recently, some recent weight loss as well.  Orders:    Iron Panel (Includes Ferritin, Iron Sat%, Iron, and TIBC); " Future          No orders of the defined types were placed in this encounter.      Depression Screening and Follow-up Plan: Patient was screened for depression during today's encounter. They screened negative with a PHQ-9 score of 0.    Preventive health issues were discussed with patient, and age appropriate screening tests were ordered as noted in patient's After Visit Summary. Personalized health advice and appropriate referrals for health education or preventive services given if needed, as noted in patient's After Visit Summary.    History of Present Illness         Patient Care Team:  Toi Swanson DO as PCP - General (Family Medicine)    Review of Systems   Constitutional:  Positive for fatigue. Negative for activity change, chills and fever.   HENT:  Negative for sore throat and trouble swallowing.    Eyes:  Negative for visual disturbance.   Respiratory:  Negative for chest tightness and shortness of breath.    Cardiovascular:  Negative for chest pain.   Gastrointestinal:  Positive for constipation. Negative for abdominal pain, nausea and vomiting.   Genitourinary:         Occasional urinary leakage   Musculoskeletal:  Positive for arthralgias (L knee, chronic).   Neurological:  Negative for dizziness and light-headedness.   Hematological:  Negative for adenopathy.   Psychiatric/Behavioral:  Positive for dysphoric mood. Negative for confusion and suicidal ideas. The patient is nervous/anxious.      Annual Wellness Visit Questionnaire       Health Risk Assessment:   Patient rates overall health as good. Patient feels that their physical health rating is same. Patient is satisfied with their life. Eyesight was rated as slightly worse. Hearing was rated as same. Patient feels that their emotional and mental health rating is same. Patients states they are sometimes angry. Patient states they are often unusually tired/fatigued. Pain experienced in the last 7 days has been some. Patient's pain rating has been 5/10.  Patient states that she has experienced weight loss or gain in last 6 months.     Depression Screening:   PHQ-9 Score: 0      Fall Risk Screening:   In the past year, patient has experienced: no history of falling in past year      Urinary Incontinence Screening:   Patient has leaked urine accidently in the last six months.     Home Safety:  Patient has trouble with stairs inside or outside of their home. Patient has working smoke alarms and has no working carbon monoxide detector. Home safety hazards include: loose rugs on the floor.     Nutrition:   Current diet is Limited junk food.     Medications:   Patient is currently taking over-the-counter supplements. OTC medications include: see medication list. Patient is able to manage medications.     Activities of Daily Living (ADLs)/Instrumental Activities of Daily Living (IADLs):   Walk and transfer into and out of bed and chair?: Yes  Dress and groom yourself?: Yes    Bathe or shower yourself?: Yes    Feed yourself? Yes  Do your laundry/housekeeping?: Yes  Manage your money, pay your bills and track your expenses?: Yes  Make your own meals?: Yes    Do your own shopping?: Yes    Previous Hospitalizations:   Any hospitalizations or ED visits within the last 12 months?: No      PREVENTIVE SCREENINGS      Cardiovascular Screening:    General: Screening Current      Diabetes Screening:     General: Screening Current      Cervical Cancer Screening:    General: Screening Not Indicated      Lung Cancer Screening:     General: Screening Not Indicated      Hepatitis C Screening:    General: Screening Current    Screening, Brief Intervention, and Referral to Treatment (SBIRT)    Screening  Typical number of drinks in a day: 0  Typical number of drinks in a week: 0  Interpretation: Low risk drinking behavior.    Single Item Drug Screening:  How often have you used an illegal drug (including marijuana) or a prescription medication for non-medical reasons in the past year?  "never    Single Item Drug Screen Score: 0  Interpretation: Negative screen for possible drug use disorder    Social Determinants of Health     Financial Resource Strain: Low Risk  (11/7/2023)    Overall Financial Resource Strain (CARDIA)     Difficulty of Paying Living Expenses: Not very hard   Food Insecurity: No Food Insecurity (11/8/2024)    Hunger Vital Sign     Worried About Running Out of Food in the Last Year: Never true     Ran Out of Food in the Last Year: Never true   Transportation Needs: No Transportation Needs (11/8/2024)    PRAPARE - Transportation     Lack of Transportation (Medical): No     Lack of Transportation (Non-Medical): No   Housing Stability: Low Risk  (11/8/2024)    Housing Stability Vital Sign     Unable to Pay for Housing in the Last Year: No     Number of Times Moved in the Last Year: 0     Homeless in the Last Year: No   Utilities: Not At Risk (11/8/2024)    [facility] Utilities     Threatened with loss of utilities: No     No results found.    Objective     BP (!) 177/91 (BP Location: Right arm, Patient Position: Sitting, Cuff Size: Standard)   Pulse 83   Temp 97.6 °F (36.4 °C) (Temporal)   Ht 5' 3\" (1.6 m)   Wt 55.9 kg (123 lb 3.2 oz)   SpO2 98%   BMI 21.82 kg/m²       Physical Exam  Constitutional:       General: She is not in acute distress.     Appearance: She is not ill-appearing or toxic-appearing.   HENT:      Head: Normocephalic and atraumatic.      Mouth/Throat:      Pharynx: Oropharynx is clear.   Eyes:      Conjunctiva/sclera: Conjunctivae normal.   Cardiovascular:      Rate and Rhythm: Normal rate and regular rhythm.      Heart sounds: Normal heart sounds.   Pulmonary:      Effort: Pulmonary effort is normal.      Breath sounds: Normal breath sounds.   Abdominal:      Palpations: Abdomen is soft.      Tenderness: There is no abdominal tenderness.   Musculoskeletal:      Right lower leg: Edema (1+) present.      Left lower leg: Edema (1+) present.   Skin:     General: Skin is " warm and dry.   Neurological:      Mental Status: She is alert and oriented to person, place, and time.   Psychiatric:         Judgment: Judgment normal.

## 2024-11-08 NOTE — ASSESSMENT & PLAN NOTE
"She notes small amounts of \"dribbling\" if she does not go to the bathroom immediately in the morning, however describes symptoms is relatively mild and less severe than 1 year ago.  She had previously been referred to urology when she describes symptoms as more severe, however did not ultimately wish to establish.  She has not tried any pelvic floor strengthening exercises.    Plan  Given handout on pelvic floor strengthening exercises and recommended to perform several times daily  Follow-up in 3 weeks       "

## 2024-11-08 NOTE — ASSESSMENT & PLAN NOTE
She notes increased anxiety recently, partially related to health anxiety.  She has tried SSRIs as well as BuSpar in the past, which were poorly tolerated due to side effects.  Denies any SI.  Does not wish to trial additional medication at this time.  Does not wish to pursue therapy at this time.  Will continue to monitor at follow-up appointment in 3 weeks.  Orders:    TSH, 3rd generation with Free T4 reflex; Future    CBC and differential; Future    Iron Panel (Includes Ferritin, Iron Sat%, Iron, and TIBC); Future

## 2024-11-08 NOTE — ASSESSMENT & PLAN NOTE
177/91 on arrival, 162/82 on recheck. Worsened significantly since last appointment.  Patient does endorse eating a poor diet recently, especially junk foods that are high in salt.  She notes that she has been compliant with regimen of amlodipine 5 and irbesartan 150 daily.  Denies any symptoms.  She does endorse being more anxious recently, partially due to anxiety over increased lower extremity edema.    Plan  Continue current doses of amlodipine and irbesartan  Trial HCTZ 12.5 daily  Follow-up in 3 weeks for recheck  Orders:    Comprehensive metabolic panel; Future    hydroCHLOROthiazide 12.5 mg tablet; Take 1 tablet (12.5 mg total) by mouth daily

## 2024-11-11 DIAGNOSIS — K21.9 GASTROESOPHAGEAL REFLUX DISEASE WITHOUT ESOPHAGITIS: ICD-10-CM

## 2024-11-11 DIAGNOSIS — I10 ESSENTIAL HYPERTENSION: ICD-10-CM

## 2024-11-11 RX ORDER — PANTOPRAZOLE SODIUM 40 MG/1
TABLET, DELAYED RELEASE ORAL
Qty: 90 TABLET | Refills: 1 | Status: SHIPPED | OUTPATIENT
Start: 2024-11-11

## 2024-11-11 RX ORDER — IRBESARTAN 150 MG/1
TABLET ORAL
Qty: 90 TABLET | Refills: 1 | Status: SHIPPED | OUTPATIENT
Start: 2024-11-11

## 2024-11-15 ENCOUNTER — APPOINTMENT (OUTPATIENT)
Dept: LAB | Facility: HOSPITAL | Age: 69
End: 2024-11-15
Payer: MEDICARE

## 2024-11-15 DIAGNOSIS — L60.3 BRITTLE NAILS: ICD-10-CM

## 2024-11-15 DIAGNOSIS — R53.83 OTHER FATIGUE: ICD-10-CM

## 2024-11-15 DIAGNOSIS — I10 ESSENTIAL HYPERTENSION: ICD-10-CM

## 2024-11-15 DIAGNOSIS — R60.0 LOWER EXTREMITY EDEMA: ICD-10-CM

## 2024-11-15 DIAGNOSIS — E44.1 MILD PROTEIN-CALORIE MALNUTRITION (HCC): ICD-10-CM

## 2024-11-15 DIAGNOSIS — F32.A DEPRESSION, UNSPECIFIED DEPRESSION TYPE: ICD-10-CM

## 2024-11-15 DIAGNOSIS — F41.9 ANXIETY: ICD-10-CM

## 2024-11-15 LAB
ALBUMIN SERPL BCG-MCNC: 4.5 G/DL (ref 3.5–5)
ALP SERPL-CCNC: 59 U/L (ref 34–104)
ALT SERPL W P-5'-P-CCNC: 16 U/L (ref 7–52)
ANION GAP SERPL CALCULATED.3IONS-SCNC: 11 MMOL/L (ref 4–13)
AST SERPL W P-5'-P-CCNC: 21 U/L (ref 13–39)
BASOPHILS # BLD AUTO: 0.04 THOUSANDS/ΜL (ref 0–0.1)
BASOPHILS NFR BLD AUTO: 1 % (ref 0–1)
BILIRUB SERPL-MCNC: 0.48 MG/DL (ref 0.2–1)
BUN SERPL-MCNC: 14 MG/DL (ref 5–25)
CALCIUM SERPL-MCNC: 9.9 MG/DL (ref 8.4–10.2)
CHLORIDE SERPL-SCNC: 99 MMOL/L (ref 96–108)
CO2 SERPL-SCNC: 28 MMOL/L (ref 21–32)
CREAT SERPL-MCNC: 0.69 MG/DL (ref 0.6–1.3)
EOSINOPHIL # BLD AUTO: 0.11 THOUSAND/ΜL (ref 0–0.61)
EOSINOPHIL NFR BLD AUTO: 2 % (ref 0–6)
ERYTHROCYTE [DISTWIDTH] IN BLOOD BY AUTOMATED COUNT: 13.5 % (ref 11.6–15.1)
FERRITIN SERPL-MCNC: 12 NG/ML (ref 11–307)
GFR SERPL CREATININE-BSD FRML MDRD: 88 ML/MIN/1.73SQ M
GLUCOSE P FAST SERPL-MCNC: 105 MG/DL (ref 65–99)
HCT VFR BLD AUTO: 40.4 % (ref 34.8–46.1)
HGB BLD-MCNC: 12.8 G/DL (ref 11.5–15.4)
IMM GRANULOCYTES # BLD AUTO: 0.02 THOUSAND/UL (ref 0–0.2)
IMM GRANULOCYTES NFR BLD AUTO: 0 % (ref 0–2)
IRON SATN MFR SERPL: 23 % (ref 15–50)
IRON SERPL-MCNC: 102 UG/DL (ref 50–212)
LYMPHOCYTES # BLD AUTO: 1.46 THOUSANDS/ΜL (ref 0.6–4.47)
LYMPHOCYTES NFR BLD AUTO: 25 % (ref 14–44)
MCH RBC QN AUTO: 30.3 PG (ref 26.8–34.3)
MCHC RBC AUTO-ENTMCNC: 31.7 G/DL (ref 31.4–37.4)
MCV RBC AUTO: 96 FL (ref 82–98)
MONOCYTES # BLD AUTO: 0.31 THOUSAND/ΜL (ref 0.17–1.22)
MONOCYTES NFR BLD AUTO: 5 % (ref 4–12)
NEUTROPHILS # BLD AUTO: 3.89 THOUSANDS/ΜL (ref 1.85–7.62)
NEUTS SEG NFR BLD AUTO: 67 % (ref 43–75)
NRBC BLD AUTO-RTO: 0 /100 WBCS
PLATELET # BLD AUTO: 322 THOUSANDS/UL (ref 149–390)
PMV BLD AUTO: 10.6 FL (ref 8.9–12.7)
POTASSIUM SERPL-SCNC: 3.9 MMOL/L (ref 3.5–5.3)
PROT SERPL-MCNC: 7.5 G/DL (ref 6.4–8.4)
RBC # BLD AUTO: 4.22 MILLION/UL (ref 3.81–5.12)
SODIUM SERPL-SCNC: 138 MMOL/L (ref 135–147)
TIBC SERPL-MCNC: 435 UG/DL (ref 250–450)
TSH SERPL DL<=0.05 MIU/L-ACNC: 2.45 UIU/ML (ref 0.45–4.5)
UIBC SERPL-MCNC: 333 UG/DL (ref 155–355)
WBC # BLD AUTO: 5.83 THOUSAND/UL (ref 4.31–10.16)

## 2024-11-15 PROCEDURE — 82728 ASSAY OF FERRITIN: CPT

## 2024-11-15 PROCEDURE — 80053 COMPREHEN METABOLIC PANEL: CPT

## 2024-11-15 PROCEDURE — 85025 COMPLETE CBC W/AUTO DIFF WBC: CPT

## 2024-11-15 PROCEDURE — 84443 ASSAY THYROID STIM HORMONE: CPT

## 2024-11-15 PROCEDURE — 83540 ASSAY OF IRON: CPT

## 2024-11-15 PROCEDURE — 36415 COLL VENOUS BLD VENIPUNCTURE: CPT

## 2024-11-15 PROCEDURE — 83550 IRON BINDING TEST: CPT

## 2024-11-29 ENCOUNTER — RESULTS FOLLOW-UP (OUTPATIENT)
Dept: FAMILY MEDICINE CLINIC | Facility: CLINIC | Age: 69
End: 2024-11-29

## 2024-12-20 ENCOUNTER — OFFICE VISIT (OUTPATIENT)
Dept: FAMILY MEDICINE CLINIC | Facility: CLINIC | Age: 69
End: 2024-12-20

## 2024-12-20 VITALS
TEMPERATURE: 98.7 F | SYSTOLIC BLOOD PRESSURE: 152 MMHG | HEART RATE: 92 BPM | BODY MASS INDEX: 21.79 KG/M2 | OXYGEN SATURATION: 98 % | DIASTOLIC BLOOD PRESSURE: 80 MMHG | WEIGHT: 123 LBS

## 2024-12-20 DIAGNOSIS — R21 RASH: ICD-10-CM

## 2024-12-20 DIAGNOSIS — I10 ESSENTIAL HYPERTENSION: Primary | ICD-10-CM

## 2024-12-20 DIAGNOSIS — F41.9 ANXIETY: ICD-10-CM

## 2024-12-20 NOTE — PROGRESS NOTES
"Name: Chel Barber      : 1955      MRN: [id number]  Encounter Provider: Toi Swanson DO  Encounter Date: 2024   Encounter department: [facility]  :  Assessment & Plan  Essential hypertension  170/86 on presentation, however patient visibly anxious.  152/80 on recheck.  She notes not taking previously added HCTZ because she does not want take too many pills.  She notes compliance with amlodipine 5 and irbesartan 150 daily.  She notes readings on home cuff 130/80 range.    Plan  Continue current regimen for now, she will check blood pressures at home several times per week, bring home cuff as well as readings to follow-up with in 1 month to compare with in office readings.       Rash  Likely secondary to dry skin/use of irritants.  Picture below under physical exam.  She was advised to avoid using alcohol on the rash, avoid using antifungals on the rash, avoid harsh or scented soaps.  Use gentle soap with cold showers, use thick moisturizing cream such as Eucerin, Cetaphil, or CeraVe a after every shower as well as at least 1 other time per day.  Follow-up 1 month.       Anxiety  Longstanding, previously trialed Prozac as well as BuSpar, both of which were discontinued due to intolerance.  Discussed potential benefit of therapy today, however she declines stating that if she needs to talk to somebody she can talk to God.  Will continue to monitor, reevaluate at follow-up in 1 month.              History of Present Illness     HPI    69-year-old female with history of hypertension presents for blood pressure check.  At last visit, she was started on HCTZ 12.5 daily in addition to amlodipine 5 mg daily and irbesartan 150 mg daily.  Today she states that she has not been taking HCTZ because she \"does not want to take too many pills\".  She states that she has been checking her blood pressure at home and that readings generally range in the 130/80 range.  She also notes a bilateral " rash on her legs that has been present 4-5 days. She notes a mild itching/burning associated with the rash, which is slightly worse when clothing is on it. She notes that she has been using a new body was with tea tree oil as well as some alcohol  and anti fungal cream  on the rash, both of which she started  after the rash appeared. She also notes having had a similar rash in the past    Review of Systems   Constitutional:  Negative for activity change, chills and fever.   Respiratory:  Negative for shortness of breath.    Cardiovascular:  Negative for chest pain.   Skin:  Positive for rash.   Neurological:  Negative for weakness.   Psychiatric/Behavioral:  The patient is nervous/anxious.        Objective   There were no vitals taken for this visit.     Physical Exam  Constitutional:       General: She is not in acute distress.     Appearance: She is normal weight. She is not ill-appearing, toxic-appearing or diaphoretic.   HENT:      Head: Normocephalic and atraumatic.      Nose: Nose normal.   Eyes:      Conjunctiva/sclera: Conjunctivae normal.   Cardiovascular:      Rate and Rhythm: Normal rate and regular rhythm.   Pulmonary:      Effort: Pulmonary effort is normal.      Breath sounds: Normal breath sounds.   Abdominal:      General: Abdomen is flat.      Palpations: Abdomen is soft.   Skin:     General: Skin is warm and dry.      Findings: Rash (Diffuse red, nonblanchable, nontender, rash present on lower extremities, most prominent anteriorly. shown below.) present.   Neurological:      Mental Status: She is alert and oriented to person, place, and time.   Psychiatric:      Comments: anxious

## 2024-12-20 NOTE — PATIENT INSTRUCTIONS
For the rash on your legs try one of the following creams twice per day (after showering and one other time per day):   -Eucerin  -Cera-ve  -Cetaphil    *Please be sure to bring your blood pressure cuff to your follow up visit

## 2024-12-20 NOTE — ASSESSMENT & PLAN NOTE
Longstanding, previously trialed Prozac as well as BuSpar, both of which were discontinued due to intolerance.  Discussed potential benefit of therapy today, however she declines stating that if she needs to talk to somebody she can talk to God.  Will continue to monitor, reevaluate at follow-up in 1 month.

## 2024-12-20 NOTE — ASSESSMENT & PLAN NOTE
170/86 on presentation, however patient visibly anxious.  152/80 on recheck.  She notes not taking previously added HCTZ because she does not want take too many pills.  She notes compliance with amlodipine 5 and irbesartan 150 daily.  She notes readings on home cuff 130/80 range.    Plan  Continue current regimen for now, she will check blood pressures at home several times per week, bring home cuff as well as readings to follow-up with in 1 month to compare with in office readings.

## 2024-12-30 ENCOUNTER — TELEPHONE (OUTPATIENT)
Dept: FAMILY MEDICINE CLINIC | Facility: CLINIC | Age: 69
End: 2024-12-30

## 2025-04-19 DIAGNOSIS — I10 ESSENTIAL HYPERTENSION: ICD-10-CM

## 2025-04-19 RX ORDER — AMLODIPINE BESYLATE 5 MG/1
5 TABLET ORAL DAILY
Qty: 30 TABLET | Refills: 5 | Status: SHIPPED | OUTPATIENT
Start: 2025-04-19

## 2025-05-06 DIAGNOSIS — I10 ESSENTIAL HYPERTENSION: ICD-10-CM

## 2025-05-06 DIAGNOSIS — K21.9 GASTROESOPHAGEAL REFLUX DISEASE WITHOUT ESOPHAGITIS: ICD-10-CM

## 2025-05-07 RX ORDER — IRBESARTAN 150 MG/1
150 TABLET ORAL DAILY
Qty: 90 TABLET | Refills: 1 | Status: SHIPPED | OUTPATIENT
Start: 2025-05-07

## 2025-05-07 RX ORDER — PANTOPRAZOLE SODIUM 40 MG/1
40 TABLET, DELAYED RELEASE ORAL DAILY
Qty: 90 TABLET | Refills: 1 | Status: SHIPPED | OUTPATIENT
Start: 2025-05-07